# Patient Record
Sex: FEMALE | Race: WHITE | NOT HISPANIC OR LATINO | Employment: OTHER | URBAN - NONMETROPOLITAN AREA
[De-identification: names, ages, dates, MRNs, and addresses within clinical notes are randomized per-mention and may not be internally consistent; named-entity substitution may affect disease eponyms.]

---

## 2017-01-03 ENCOUNTER — OFFICE VISIT (OUTPATIENT)
Dept: CARDIOLOGY | Facility: CLINIC | Age: 67
End: 2017-01-03
Payer: MEDICARE

## 2017-01-03 VITALS
HEART RATE: 65 BPM | HEIGHT: 66 IN | DIASTOLIC BLOOD PRESSURE: 80 MMHG | BODY MASS INDEX: 35.36 KG/M2 | WEIGHT: 220 LBS | SYSTOLIC BLOOD PRESSURE: 150 MMHG | OXYGEN SATURATION: 91 %

## 2017-01-03 DIAGNOSIS — R55 SYNCOPE AND COLLAPSE: ICD-10-CM

## 2017-01-03 DIAGNOSIS — R06.09 DOE (DYSPNEA ON EXERTION): ICD-10-CM

## 2017-01-03 DIAGNOSIS — I10 HYPERTENSION, ESSENTIAL: ICD-10-CM

## 2017-01-03 DIAGNOSIS — E78.5 DYSLIPIDEMIA: ICD-10-CM

## 2017-01-03 DIAGNOSIS — Z98.890 H/O CAROTID ENDARTERECTOMY: ICD-10-CM

## 2017-01-03 LAB — EKG IMPRESSION: NORMAL

## 2017-01-03 PROCEDURE — 1036F TOBACCO NON-USER: CPT | Performed by: INTERNAL MEDICINE

## 2017-01-03 PROCEDURE — G8427 DOCREV CUR MEDS BY ELIG CLIN: HCPCS | Performed by: INTERNAL MEDICINE

## 2017-01-03 PROCEDURE — 3017F COLORECTAL CA SCREEN DOC REV: CPT | Mod: 1P | Performed by: INTERNAL MEDICINE

## 2017-01-03 PROCEDURE — 99204 OFFICE O/P NEW MOD 45 MIN: CPT | Performed by: INTERNAL MEDICINE

## 2017-01-03 PROCEDURE — G8419 CALC BMI OUT NRM PARAM NOF/U: HCPCS | Performed by: INTERNAL MEDICINE

## 2017-01-03 PROCEDURE — 93000 ELECTROCARDIOGRAM COMPLETE: CPT | Performed by: INTERNAL MEDICINE

## 2017-01-03 RX ORDER — METOPROLOL TARTRATE 100 MG/1
100 TABLET ORAL
COMMUNITY
End: 2017-06-26

## 2017-01-03 ASSESSMENT — ENCOUNTER SYMPTOMS
PALPITATIONS: 0
PND: 0
HEADACHES: 1
BLOOD IN STOOL: 1
TINGLING: 1
SHORTNESS OF BREATH: 1
DEPRESSION: 1
NERVOUS/ANXIOUS: 1
COUGH: 0
MYALGIAS: 1
ABDOMINAL PAIN: 1
FEVER: 1
BACK PAIN: 1
DOUBLE VISION: 1
DIZZINESS: 1
ORTHOPNEA: 0
LOSS OF CONSCIOUSNESS: 0

## 2017-01-03 NOTE — PROGRESS NOTES
Subjective:   Sylvia Diallo is a 66 y.o. female with known history of hypertension, dyslipidemia, carotid artery disease status post left carotid endarterectomy, GI ulcer with history of intermittent abdominal pain as well as melena presenting today for episodes of near syncope.    In October patient had 2 episodes of near syncope both of these episodes were precipitated when she bent her neck looking down. Did complain of spinning sensation associated with ringing in ears. Never passed out during these episodes. Intermittent episodes of fever when EBV titers are high. No complaints of exertional chest pain pressure or tightness. Does complain of dyspnea on exertion. Denied any complaints of orthopnea or PND. Intermittent episodes of lower extremity edema more prominent in [place].    Past Medical History   Diagnosis Date   • Chicken pox    • Heart disease    • Measles    • Mumps    • Rheumatic fever    • Venereal disease      test +   • EBV exposure    • [place] fever      unspecified    • Hypertension    • Rapid or irregular heartbeat    • Swelling of extremity of unknown etiology    • Sinus congestion    • Ear ache    • Enlarged glands    • Neck stiffness    • Sore throat    • Change in weight    • Blurry vision    • Eye disease    • Headache(784.0)    • Light sensitivity    • Vision loss    • Abdominal pain    • Abdominal bloating    • Dark stools    • Constipation    • Abdominal cramping    • Diarrhea    • Gallbladder disease    • Heart burn    • Hemorrhoids    • Nausea    • Peptic ulcer    • Fever    • Chills    • Fatigue    • History of night sweats    • Blood in urine    • Burning with urination    • Frequent urination    • Low libido    • Incontinent of urine    • Arthritis    • Back pain    • Difficulty walking    • Joint pain    • Muscle pain    • Varicose veins    • Dizziness    • Difficulty concentrating    • Coordination problem    • Falling    • Memory loss    • Numbness and tingling    • Visual  disturbances    • Anxiety    • Depression    • Eating disorder    • Pleurisy    • Shortness of breath    • Snoring    • URI (upper respiratory infection)    • Itching      Past Surgical History   Procedure Laterality Date   • Abdominal hysterectomy total     • Cervical fusion posterior N/A 1965   • Abdominal exploration       Gall bladder removal   • Pelvic reconstruction laparoscopic N/A 2006     Family History   Problem Relation Age of Onset   • Cancer Mother    • Arthritis Mother    • Heart Disease Mother    • Lung Disease Father    • Arthritis Father    • Heart Disease Father      History   Smoking status   • Former Smoker   • Quit date: 06/23/1972   Smokeless tobacco   • Not on file     Allergies   Allergen Reactions   • Darvon [Propoxyphene]    • Food Unspecified     fish   • Iodine Anaphylaxis     allergeic to shellfish   • Penicillins    • Seasonal    • Shellfish Allergy Anaphylaxis   • Codeine Itching     Outpatient Encounter Prescriptions as of 1/3/2017   Medication Sig Dispense Refill   • metoprolol (LOPRESSOR) 100 MG Tab Take 100 mg by mouth every bedtime.     • tramadol (ULTRAM) 50 MG Tab Take 1 Tab by mouth every 6 hours as needed. 120 Tab 0   • gabapentin (NEURONTIN) 800 MG tablet TAKE 1 TABLET THREE TIMES DAILY 270 Tab 3   • metoprolol (LOPRESSOR) 100 MG Tab TAKE 1 TABLET AT BEDTIME 90 Tab 3   • pantoprazole (PROTONIX) 40 MG Tablet Delayed Response TAKE 1 TABLET EVERY DAY 90 Tab 3   • estradiol (ESTRACE) 1 MG Tab TAKE 1 TABLET EVERY DAY 90 Tab 3   • atorvastatin (LIPITOR) 20 MG Tab TAKE 1 TABLET EVERY DAY IN THE EVENING 90 Tab 3   • citalopram (CELEXA) 10 MG tablet TAKE 1 TABLET EVERY DAY 90 Tab 3   • ramipril (ALTACE) 10 MG capsule Take 1 cap daily in the morning 90 Cap 3   • metoprolol (LOPRESSOR) 100 MG Tab TAKE ONE TABLET BY MOUTH AT BEDTIME 90 Tab 3   • lidocaine (LIDODERM) 5 % Patch Apply 1 Patch to skin as directed every 24 hours. 10 Patch 3   • clindamycin (CLEOCIN) 150 MG Cap 2  Caps 1 hour  "prior to dental work 2 Cap 0   • metoprolol (LOPRESSOR) 100 MG Tab TAKE 1 TABLET AT BEDTIME 90 Tab 3   • triamcinolone acetonide (KENALOG) 0.1 % Cream Apply bid prn to affected area 60 g 3   • lovastatin (MEVACOR) 20 MG Tab 1 at hs 90 Tab 3   • rizatriptan (MAXALT) 10 MG tablet Take 1 Tab by mouth Once PRN for Migraine for up to 1 dose. 10 Tab 3     No facility-administered encounter medications on file as of 1/3/2017.     Review of Systems   Constitutional: Positive for fever (when EBV titiers goes up).   HENT: Negative for tinnitus.    Eyes: Positive for double vision.        Decreased vision in right eye   Respiratory: Positive for shortness of breath. Negative for cough.    Cardiovascular: Negative for chest pain, palpitations, orthopnea, leg swelling and PND.   Gastrointestinal: Positive for abdominal pain and blood in stool.   Genitourinary: Negative for hematuria.   Musculoskeletal: Positive for myalgias, back pain and joint pain.   Skin: Negative for itching and rash.   Neurological: Positive for dizziness, tingling and headaches. Negative for loss of consciousness.   Psychiatric/Behavioral: Positive for depression. Negative for suicidal ideas. The patient is nervous/anxious.         Objective:   /80 mmHg  Pulse 65  Ht 1.664 m (5' 5.5\")  Wt 99.791 kg (220 lb)  BMI 36.04 kg/m2  SpO2 91%  LMP 01/01/1976    Physical Exam   Constitutional: She is oriented to person, place, and time. She appears well-developed and well-nourished.   HENT:   Head: Normocephalic and atraumatic.   Eyes: Conjunctivae are normal. Pupils are equal, round, and reactive to light. Right eye exhibits no discharge. Left eye exhibits no discharge.   Neck: No JVD present. No tracheal deviation present.   Cardiovascular: Normal rate and regular rhythm.    No murmur heard.  Pulses:       Carotid pulses are 2+ on the right side, and 2+ on the left side.       Radial pulses are 2+ on the right side, and 2+ on the left side.        " Dorsalis pedis pulses are 2+ on the right side, and 2+ on the left side.        Posterior tibial pulses are 2+ on the right side, and 2+ on the left side.   Pulmonary/Chest: Effort normal and breath sounds normal. No respiratory distress. She has no wheezes. She has no rales.   Abdominal: Soft. Bowel sounds are normal. She exhibits no distension. There is no tenderness. There is no rebound.   Musculoskeletal: Normal range of motion. She exhibits no edema.   Neurological: She is alert and oriented to person, place, and time. No cranial nerve deficit.   Skin: Skin is warm and dry. No erythema.   Psychiatric: She has a normal mood and affect. Her behavior is normal.     EK/3/17  Sinus bradycardia 55 bpm, normal axis T-wave inversion anterior leads  Assessment:     1. Syncope and collapse  EKG   2. H/O carotid endarterectomy  US-CAROTID DOPPLER   3. MONGE (dyspnea on exertion)  Echo-Rest/Stress w/o Contrast    Echocardiogram Comp w/o Cont   4. Dyslipidemia  COMP METABOLIC PANEL    LIPID PROFILE       Medical Decision Making:  Today's Assessment / Status / Plan:     1. Most probably benign positional vertigo  If patient has any further episodes of vertigo advised her to try over-the-counter Antivert.  2. Carotid artery Doppler prior to next visit.  3. Given T-wave inversion in anterior leads and complaints of dyspnea on exertion echo and stress echo prior to next visit.  Advised patient to take metoprolol 50 mg BID, other than taking 100 mg daily at bedtime.  4. Continue Lipitor 20 mg qHs.  Labs prior to next visit.  5. Patient's blood pressure is elevated as she did not take her a.m. antihypertensive agents.  Advised her to monitor blood pressure closely at home.  Continue ramipril 10 mg daily.    Follow-up in 6-8 weeks.  Echo, stress echo, carotid Doppler and labs prior to next visit.    Thank you for allowing me to participate in taking care of patient.    Maura Del Cid MD.

## 2017-01-03 NOTE — MR AVS SNAPSHOT
"        Sylvia Diallo   1/3/2017 9:00 AM   Office Visit   MRN: [id number]    Department:  [facility]   Dept Phone:  [phone]    Description:  Female : 1950   Provider:  Maura Gates M.D.           Reason for Visit     New Patient           Allergies as of 1/3/2017     Allergen Noted Reactions    Darvon [Propoxyphene] 2015       Food 2015   Unspecified    fish    Iodine 2017   Anaphylaxis    allergeic to shellfish    Penicillins 2015       Seasonal 2015       Shellfish Allergy 2017   Anaphylaxis    Codeine 2016   Itching      You were diagnosed with     Syncope and collapse   [780.2.ICD-9-CM]       H/O carotid endarterectomy   [570101]       MONGE (dyspnea on exertion)   [342539]       Dyslipidemia   [143120]         Vital Signs     Blood Pressure Pulse Height Weight Body Mass Index Oxygen Saturation    150/80 mmHg 65 1.664 m (5' 5.5\") 99.791 kg (220 lb) 36.04 kg/m2 91%    Last Menstrual Period Smoking Status                1976 Former Smoker          Basic Information     Date Of Birth Sex Race Ethnicity Preferred Language    1950 Female White Non- English      Your appointments     2017 10:30 AM   30 Minute with Arun Cheema M.D.   [facility] (--)    [address] [place] [address], [address]  [facility] [id number] [phone]            2017  9:20 AM   FOLLOW UP with Maura Gates M.D.   [facility]    [facility] [address]  2nd [address]  [facility] [id number] [phone]              Problem List              ICD-10-CM Priority Class Noted - Resolved    Migraine without aura and without status migrainosus, not intractable G43.009   2015 - Present    Bilateral carotid artery disease (HCC) I77.9   2015 - Present    Hyperlipidemia E78.5   2015 - Present    Hypertension, essential I10   2015 - Present    Osteoarthritis cervical " spine M47.812   8/21/2015 - Present    Chronic neck pain M54.2, G89.29   8/21/2015 - Present    Family hx of colon cancer Z80.0   9/29/2015 - Present    History of peptic ulcer disease Z87.11   9/29/2015 - Present    History of colon polyps Z86.010   11/4/2015 - Present    Diverticulosis of large intestine without hemorrhage K57.30   11/13/2015 - Present    Internal hemorrhoids K64.8   11/13/2015 - Present    H/O carotid endarterectomy Z98.890   1/3/2017 - Present      Health Maintenance        Date Due Completion Dates    IMM DTaP/Tdap/Td Vaccine (1 - Tdap) 2/26/1967 ---    PAP SMEAR 2/26/1969 ---    MAMMOGRAM 2/26/1988 ---    COLONOSCOPY 2/26/1998 ---    IMM ZOSTER VACCINE 2/26/2008 ---    BONE DENSITY 2/26/2013 ---    IMM INFLUENZA (1) 9/1/2016 10/29/2015    IMM PNEUMOCOCCAL 65+ (ADULT) LOW/MEDIUM RISK SERIES (2 of 2 - PPSV23) 3/1/2017 3/1/2016            Results       Current Immunizations     13-VALENT PCV PREVNAR 3/1/2016  9:46 AM    Influenza Vaccine Quad Inj (Preserved) 10/29/2015      Below and/or attached are the medications your provider expects you to take. Review all of your home medications and newly ordered medications with your provider and/or pharmacist. Follow medication instructions as directed by your provider and/or pharmacist. Please keep your medication list with you and share with your provider. Update the information when medications are discontinued, doses are changed, or new medications (including over-the-counter products) are added; and carry medication information at all times in the event of emergency situations     Allergies:  DARVON - (reactions not documented)     FOOD - Unspecified     IODINE - Anaphylaxis     PENICILLINS - (reactions not documented)     SEASONAL - (reactions not documented)     SHELLFISH ALLERGY - Anaphylaxis     CODEINE - Itching               Medications  Valid as of: January 03, 2017 -  9:35 AM    Generic Name Brand Name Tablet Size Instructions for use     Atorvastatin Calcium (Tab) LIPITOR 20 MG TAKE 1 TABLET EVERY DAY IN THE EVENING        Citalopram Hydrobromide (Tab) CELEXA 10 MG TAKE 1 TABLET EVERY DAY        Clindamycin HCl (Cap) CLEOCIN 150 MG 2  Caps 1 hour prior to dental work        Estradiol (Tab) ESTRACE 1 MG TAKE 1 TABLET EVERY DAY        Gabapentin (Tab) NEURONTIN 800 MG TAKE 1 TABLET THREE TIMES DAILY        Lidocaine (Patch) LIDODERM 5 % Apply 1 Patch to skin as directed every 24 hours.        Lovastatin (Tab) MEVACOR 20 MG 1 at hs        Metoprolol Tartrate (Tab) LOPRESSOR 100 MG TAKE 1 TABLET AT BEDTIME        Metoprolol Tartrate (Tab) LOPRESSOR 100 MG TAKE 1 TABLET AT BEDTIME        Metoprolol Tartrate (Tab) LOPRESSOR 100 MG TAKE ONE TABLET BY MOUTH AT BEDTIME        Metoprolol Tartrate (Tab) LOPRESSOR 100 MG Take 100 mg by mouth every bedtime.        Pantoprazole Sodium (Tablet Delayed Response) PROTONIX 40 MG TAKE 1 TABLET EVERY DAY        Ramipril (Cap) ALTACE 10 MG Take 1 cap daily in the morning        Rizatriptan Benzoate (Tab) MAXALT 10 MG Take 1 Tab by mouth Once PRN for Migraine for up to 1 dose.        TraMADol HCl (Tab) ULTRAM 50 MG Take 1 Tab by mouth every 6 hours as needed.        Triamcinolone Acetonide (Cream) KENALOG 0.1 % Apply bid prn to affected area        .                 Medicines prescribed today were sent to:     [facility] - [place], [place] - [address] [facility] [address] [facility]    [address] [facility] [address]    Phone: [phone] Fax: [phone]    Open 24 Hours?: No    [facility] [address] [place], [place] - [address] [address] [facility] [address]    Phone: [phone] Fax: [phone]    Open 24 Hours?: No      Medication refill instructions:       If your prescription bottle indicates you have medication refills left, it is not necessary to call your provider’s office. Please contact your pharmacy and they will refill your medication.    If your prescription bottle indicates you do  not have any refills left, you may request refills at any time through one of the following ways: The online [employer] system (except Urgent Care), by calling your provider’s office, or by asking your pharmacy to contact your provider’s office with a refill request. Medication refills are processed only during regular business hours and may not be available until the next business day. Your provider may request additional information or to have a follow-up visit with you prior to refilling your medication.   *Please Note: Medication refills are assigned a new Rx number when refilled electronically. Your pharmacy may indicate that no refills were authorized even though a new prescription for the same medication is available at the pharmacy. Please request the medicine by name with the pharmacy before contacting your provider for a refill.        Your To Do List     Future Labs/Procedures Complete By Expires    COMP METABOLIC PANEL  As directed 1/3/2018    Echo-Rest/Stress w/o Contrast  As directed 1/3/2018    Echocardiogram Comp w/o Cont  As directed 1/3/2018    LIPID PROFILE  As directed 1/3/2018    US-CAROTID DOPPLER  As directed 1/3/2018         [employer] Access Code: Activation code not generated  Current [employer] Status: Active

## 2017-01-03 NOTE — Clinical Note
[facility] [place] [address],   [address]  [place] [address]  Phone: [phone]  Fax: [phone]              Sylvia Diallo  1950    Encounter Date: 1/3/2017    Maura Gates M.D.          PROGRESS NOTE:  Subjective:   Sylvia Diallo is a 66 y.o. female with known history of hypertension, dyslipidemia, carotid artery disease status post left carotid endarterectomy, GI ulcer with history of intermittent abdominal pain as well as melena presenting today for episodes of near syncope.    In October patient had 2 episodes of near syncope both of these episodes were precipitated when she bent her neck looking down. Did complain of spinning sensation associated with ringing in ears. Never passed out during these episodes. Intermittent episodes of fever when EBV titers are high. No complaints of exertional chest pain pressure or tightness. Does complain of dyspnea on exertion. Denied any complaints of orthopnea or PND. Intermittent episodes of lower extremity edema more prominent in [place].    Past Medical History   Diagnosis Date   • Chicken pox    • Heart disease    • Measles    • Mumps    • Rheumatic fever    • Venereal disease      test +   • EBV exposure    • [place] fever      unspecified    • Hypertension    • Rapid or irregular heartbeat    • Swelling of extremity of unknown etiology    • Sinus congestion    • Ear ache    • Enlarged glands    • Neck stiffness    • Sore throat    • Change in weight    • Blurry vision    • Eye disease    • Headache(784.0)    • Light sensitivity    • Vision loss    • Abdominal pain    • Abdominal bloating    • Dark stools    • Constipation    • Abdominal cramping    • Diarrhea    • Gallbladder disease    • Heart burn    • Hemorrhoids    • Nausea    • Peptic ulcer    • Fever    • Chills    • Fatigue    • History of night sweats    • Blood in urine    • Burning with urination    • Frequent urination    •  Low libido    • Incontinent of urine    • Arthritis    • Back pain    • Difficulty walking    • Joint pain    • Muscle pain    • Varicose veins    • Dizziness    • Difficulty concentrating    • Coordination problem    • Falling    • Memory loss    • Numbness and tingling    • Visual disturbances    • Anxiety    • Depression    • Eating disorder    • Pleurisy    • Shortness of breath    • Snoring    • URI (upper respiratory infection)    • Itching      Past Surgical History   Procedure Laterality Date   • Abdominal hysterectomy total     • Cervical fusion posterior N/A 1965   • Abdominal exploration       Gall bladder removal   • Pelvic reconstruction laparoscopic N/A 2006     Family History   Problem Relation Age of Onset   • Cancer Mother    • Arthritis Mother    • Heart Disease Mother    • Lung Disease Father    • Arthritis Father    • Heart Disease Father      History   Smoking status   • Former Smoker   • Quit date: 06/23/1972   Smokeless tobacco   • Not on file     Allergies   Allergen Reactions   • Darvon [Propoxyphene]    • Food Unspecified     fish   • Iodine Anaphylaxis     allergeic to shellfish   • Penicillins    • Seasonal    • Shellfish Allergy Anaphylaxis   • Codeine Itching     Outpatient Encounter Prescriptions as of 1/3/2017   Medication Sig Dispense Refill   • metoprolol (LOPRESSOR) 100 MG Tab Take 100 mg by mouth every bedtime.     • tramadol (ULTRAM) 50 MG Tab Take 1 Tab by mouth every 6 hours as needed. 120 Tab 0   • gabapentin (NEURONTIN) 800 MG tablet TAKE 1 TABLET THREE TIMES DAILY 270 Tab 3   • metoprolol (LOPRESSOR) 100 MG Tab TAKE 1 TABLET AT BEDTIME 90 Tab 3   • pantoprazole (PROTONIX) 40 MG Tablet Delayed Response TAKE 1 TABLET EVERY DAY 90 Tab 3   • estradiol (ESTRACE) 1 MG Tab TAKE 1 TABLET EVERY DAY 90 Tab 3   • atorvastatin (LIPITOR) 20 MG Tab TAKE 1 TABLET EVERY DAY IN THE EVENING 90 Tab 3   • citalopram (CELEXA) 10 MG tablet TAKE 1 TABLET EVERY DAY 90 Tab 3   • ramipril (ALTACE) 10  "MG capsule Take 1 cap daily in the morning 90 Cap 3   • metoprolol (LOPRESSOR) 100 MG Tab TAKE ONE TABLET BY MOUTH AT BEDTIME 90 Tab 3   • lidocaine (LIDODERM) 5 % Patch Apply 1 Patch to skin as directed every 24 hours. 10 Patch 3   • clindamycin (CLEOCIN) 150 MG Cap 2  Caps 1 hour prior to dental work 2 Cap 0   • metoprolol (LOPRESSOR) 100 MG Tab TAKE 1 TABLET AT BEDTIME 90 Tab 3   • triamcinolone acetonide (KENALOG) 0.1 % Cream Apply bid prn to affected area 60 g 3   • lovastatin (MEVACOR) 20 MG Tab 1 at hs 90 Tab 3   • rizatriptan (MAXALT) 10 MG tablet Take 1 Tab by mouth Once PRN for Migraine for up to 1 dose. 10 Tab 3     No facility-administered encounter medications on file as of 1/3/2017.     Review of Systems   Constitutional: Positive for fever (when EBV titiers goes up).   HENT: Negative for tinnitus.    Eyes: Positive for double vision.        Decreased vision in right eye   Respiratory: Positive for shortness of breath. Negative for cough.    Cardiovascular: Negative for chest pain, palpitations, orthopnea, leg swelling and PND.   Gastrointestinal: Positive for abdominal pain and blood in stool.   Genitourinary: Negative for hematuria.   Musculoskeletal: Positive for myalgias, back pain and joint pain.   Skin: Negative for itching and rash.   Neurological: Positive for dizziness, tingling and headaches. Negative for loss of consciousness.   Psychiatric/Behavioral: Positive for depression. Negative for suicidal ideas. The patient is nervous/anxious.         Objective:   /80 mmHg  Pulse 65  Ht 1.664 m (5' 5.5\")  Wt 99.791 kg (220 lb)  BMI 36.04 kg/m2  SpO2 91%  LMP 01/01/1976    Physical Exam   Constitutional: She is oriented to person, place, and time. She appears well-developed and well-nourished.   HENT:   Head: Normocephalic and atraumatic.   Eyes: Conjunctivae are normal. Pupils are equal, round, and reactive to light. Right eye exhibits no discharge. Left eye exhibits no discharge.   "   Neck: No JVD present. No tracheal deviation present.   Cardiovascular: Normal rate and regular rhythm.    No murmur heard.  Pulses:       Carotid pulses are 2+ on the right side, and 2+ on the left side.       Radial pulses are 2+ on the right side, and 2+ on the left side.        Dorsalis pedis pulses are 2+ on the right side, and 2+ on the left side.        Posterior tibial pulses are 2+ on the right side, and 2+ on the left side.   Pulmonary/Chest: Effort normal and breath sounds normal. No respiratory distress. She has no wheezes. She has no rales.   Abdominal: Soft. Bowel sounds are normal. She exhibits no distension. There is no tenderness. There is no rebound.   Musculoskeletal: Normal range of motion. She exhibits no edema.   Neurological: She is alert and oriented to person, place, and time. No cranial nerve deficit.   Skin: Skin is warm and dry. No erythema.   Psychiatric: She has a normal mood and affect. Her behavior is normal.     EK/3/17  Sinus bradycardia 55 bpm, normal axis T-wave inversion anterior leads  Assessment:     1. Syncope and collapse  EKG   2. H/O carotid endarterectomy  US-CAROTID DOPPLER   3. MONGE (dyspnea on exertion)  Echo-Rest/Stress w/o Contrast    Echocardiogram Comp w/o Cont   4. Dyslipidemia  COMP METABOLIC PANEL    LIPID PROFILE       Medical Decision Making:  Today's Assessment / Status / Plan:     1. Most probably benign positional vertigo  If patient has any further episodes of vertigo advised her to try over-the-counter Antivert.  2. Carotid artery Doppler prior to next visit.  3. Given T-wave inversion in anterior leads and complaints of dyspnea on exertion echo and stress echo prior to next visit.  Advised patient to take metoprolol 50 mg BID, other than taking 100 mg daily at bedtime.  4. Continue Lipitor 20 mg qHs.  Labs prior to next visit.  5. Patient's blood pressure is elevated as she did not take her a.m. antihypertensive agents.  Advised her to monitor blood  pressure closely at home.  Continue ramipril 10 mg daily.    Follow-up in 6-8 weeks.  Echo, stress echo, carotid Doppler and labs prior to next visit.    Thank you for allowing me to participate in taking care of patient.    Maura Del Cid MD.      Arun Cheema M.D.  [address] [facility] [id number]  VIA In Basket

## 2017-01-06 ENCOUNTER — TELEPHONE (OUTPATIENT)
Dept: CARDIOLOGY | Facility: MEDICAL CENTER | Age: 67
End: 2017-01-06

## 2017-01-06 NOTE — TELEPHONE ENCOUNTER
----- Message from Yanely Schroeder L.P.N. sent at 1/6/2017  2:14 PM PST -----      ----- Message -----     From: Maura Gates M.D.     Sent: 1/6/2017  12:40 PM       To: Yisel Berkowitz R.N.    No evidence of hemodynamically significant stenosis (zero to 40%).  ----- Message -----     From: Yisel Berkowitz R.N.     Sent: 1/6/2017  12:02 PM       To: Maura Gates M.D.

## 2017-01-06 NOTE — PROGRESS NOTES
Stress echo: 1/30/17  Patient exercised on treadmill for 4 minutes reached 103% MPHR achieved 7 METs.  Negative stress echocardiogram for ischemia with adequate stress achieved by heart rate criteria    Carotid doppler: 1/5/17  No evidence of hemodynamically significant stenosis (zero to 40%).

## 2017-01-30 ENCOUNTER — NON-PROVIDER VISIT (OUTPATIENT)
Dept: CARDIOLOGY | Facility: PHYSICIAN GROUP | Age: 67
End: 2017-01-30
Payer: MEDICARE

## 2017-01-30 DIAGNOSIS — R06.09 DOE (DYSPNEA ON EXERTION): ICD-10-CM

## 2017-01-30 PROCEDURE — 93350 STRESS TTE ONLY: CPT | Performed by: INTERNAL MEDICINE

## 2017-01-30 PROCEDURE — 93018 CV STRESS TEST I&R ONLY: CPT | Performed by: INTERNAL MEDICINE

## 2017-02-01 LAB — LV EJECT FRACT  99904: 55

## 2017-02-06 ENCOUNTER — TELEPHONE (OUTPATIENT)
Dept: CARDIOLOGY | Facility: MEDICAL CENTER | Age: 67
End: 2017-02-06

## 2017-02-06 NOTE — TELEPHONE ENCOUNTER
----- Message from Yanely Schroeder L.P.N. sent at 2/6/2017  8:28 AM PST -----      ----- Message -----     From: Maura Gates M.D.     Sent: 2/2/2017   2:49 AM       To: Yisel Berkowitz R.N.    Negative stress echocardiogram for ischemia with adequate stress achieved by heart rate criteria  ----- Message -----     From: Yisel Berkowitz R.N.     Sent: 2/1/2017   3:29 PM       To: Maura Gates M.D.

## 2017-02-14 ENCOUNTER — TELEPHONE (OUTPATIENT)
Dept: CARDIOLOGY | Facility: MEDICAL CENTER | Age: 67
End: 2017-02-14

## 2017-02-14 ENCOUNTER — OFFICE VISIT (OUTPATIENT)
Dept: CARDIOLOGY | Facility: CLINIC | Age: 67
End: 2017-02-14
Payer: MEDICARE

## 2017-02-14 VITALS
SYSTOLIC BLOOD PRESSURE: 120 MMHG | DIASTOLIC BLOOD PRESSURE: 70 MMHG | OXYGEN SATURATION: 91 % | HEART RATE: 71 BPM | BODY MASS INDEX: 35.84 KG/M2 | WEIGHT: 223 LBS | HEIGHT: 66 IN

## 2017-02-14 DIAGNOSIS — G47.30 SLEEP APNEA, UNSPECIFIED TYPE: ICD-10-CM

## 2017-02-14 DIAGNOSIS — I10 HYPERTENSION, ESSENTIAL: ICD-10-CM

## 2017-02-14 DIAGNOSIS — Z98.890 H/O CAROTID ENDARTERECTOMY: ICD-10-CM

## 2017-02-14 DIAGNOSIS — E78.5 DYSLIPIDEMIA: ICD-10-CM

## 2017-02-14 PROCEDURE — 3017F COLORECTAL CA SCREEN DOC REV: CPT | Mod: 1P | Performed by: INTERNAL MEDICINE

## 2017-02-14 PROCEDURE — G8484 FLU IMMUNIZE NO ADMIN: HCPCS | Performed by: INTERNAL MEDICINE

## 2017-02-14 PROCEDURE — 99214 OFFICE O/P EST MOD 30 MIN: CPT | Performed by: INTERNAL MEDICINE

## 2017-02-14 PROCEDURE — 1101F PT FALLS ASSESS-DOCD LE1/YR: CPT | Performed by: INTERNAL MEDICINE

## 2017-02-14 PROCEDURE — G8432 DEP SCR NOT DOC, RNG: HCPCS | Performed by: INTERNAL MEDICINE

## 2017-02-14 PROCEDURE — 3014F SCREEN MAMMO DOC REV: CPT | Performed by: INTERNAL MEDICINE

## 2017-02-14 PROCEDURE — G8419 CALC BMI OUT NRM PARAM NOF/U: HCPCS | Performed by: INTERNAL MEDICINE

## 2017-02-14 PROCEDURE — 4040F PNEUMOC VAC/ADMIN/RCVD: CPT | Performed by: INTERNAL MEDICINE

## 2017-02-14 PROCEDURE — 1036F TOBACCO NON-USER: CPT | Performed by: INTERNAL MEDICINE

## 2017-02-14 RX ORDER — METOPROLOL TARTRATE 50 MG/1
50 TABLET, FILM COATED ORAL 2 TIMES DAILY
COMMUNITY
End: 2018-02-15

## 2017-02-14 ASSESSMENT — ENCOUNTER SYMPTOMS
PALPITATIONS: 0
BACK PAIN: 1
MYALGIAS: 1
NERVOUS/ANXIOUS: 1
SHORTNESS OF BREATH: 1
HEADACHES: 1
DEPRESSION: 1
ORTHOPNEA: 0
COUGH: 0
TINGLING: 1
PND: 0
LOSS OF CONSCIOUSNESS: 0
FEVER: 1
ABDOMINAL PAIN: 0
DOUBLE VISION: 1
DIZZINESS: 0
BLOOD IN STOOL: 0

## 2017-02-14 NOTE — PROGRESS NOTES
Subjective:   Sylvia Diallo is a 66 y.o. female with known history of hypertension, dyslipidemia, carotid artery disease status post left carotid endarterectomy, history of GI ulcer presenting today for follow-up evaluation of dyspnea on exertion.    Since last visit patient denied any further episodes of syncope. Still continues to have dyspnea on exertion which is unchanged. Complaining of generalized fatigue and tiredness. Some days patient gets up at least 3-4 times in the middle of the night to take care of her  leading to disturb sleep that may be one of the reasons why patient is feeling tired. No complaints of exertional chest pain, pressure or tightness. Intermittent episodes of abdominal pain. No complaints of lower extremity edema or claudication.    Past Medical History   Diagnosis Date   • Chicken pox    • Heart disease    • Measles    • Mumps    • Rheumatic fever    • Venereal disease      test +   • EBV exposure    • [place] fever      unspecified    • Hypertension    • Rapid or irregular heartbeat    • Swelling of extremity of unknown etiology    • Sinus congestion    • Ear ache    • Enlarged glands    • Neck stiffness    • Sore throat    • Change in weight    • Blurry vision    • Eye disease    • Headache(784.0)    • Light sensitivity    • Vision loss    • Abdominal pain    • Abdominal bloating    • Dark stools    • Constipation    • Abdominal cramping    • Diarrhea    • Gallbladder disease    • Heart burn    • Hemorrhoids    • Nausea    • Peptic ulcer    • Fever    • Chills    • Fatigue    • History of night sweats    • Blood in urine    • Burning with urination    • Frequent urination    • Low libido    • Incontinent of urine    • Arthritis    • Back pain    • Difficulty walking    • Joint pain    • Muscle pain    • Varicose veins    • Dizziness    • Difficulty concentrating    • Coordination problem    • Falling    • Memory loss    • Numbness and tingling    • Visual disturbances    •  Anxiety    • Depression    • Eating disorder    • Pleurisy    • Shortness of breath    • Snoring    • URI (upper respiratory infection)    • Itching      Past Surgical History   Procedure Laterality Date   • Abdominal hysterectomy total     • Cervical fusion posterior N/A 1965   • Abdominal exploration       Gall bladder removal   • Pelvic reconstruction laparoscopic N/A 2006     Family History   Problem Relation Age of Onset   • Cancer Mother    • Arthritis Mother    • Heart Disease Mother    • Lung Disease Father    • Arthritis Father    • Heart Disease Father      History   Smoking status   • Former Smoker   • Quit date: 06/23/1972   Smokeless tobacco   • Never Used     Allergies   Allergen Reactions   • Darvon [Propoxyphene]    • Food Unspecified     fish   • Iodine Anaphylaxis     allergeic to shellfish   • Penicillins    • Seasonal    • Shellfish Allergy Anaphylaxis   • Codeine Itching     Outpatient Encounter Prescriptions as of 2/14/2017   Medication Sig Dispense Refill   • gabapentin (NEURONTIN) 800 MG tablet TAKE 1 TABLET THREE TIMES DAILY 270 Tab 3   • citalopram (CELEXA) 10 MG tablet TAKE 1 TABLET EVERY DAY 90 Tab 3   • ofloxacin (OCUFLOX) 0.3 % Solution Place 1 Drop in right eye 4 times a day. 1 Bottle 0   • metoprolol (LOPRESSOR) 100 MG Tab TAKE ONE TABLET BY MOUTH AT BEDTIME 90 Tab 3   • pantoprazole (PROTONIX) 40 MG Tablet Delayed Response TAKE 1 TABLET EVERY DAY 90 Tab 3   • estradiol (ESTRACE) 1 MG Tab TAKE 1 TABLET EVERY DAY 90 Tab 3   • atorvastatin (LIPITOR) 20 MG Tab TAKE 1 TABLET EVERY DAY IN THE EVENING 90 Tab 3   • ramipril (ALTACE) 10 MG capsule Take 1 cap daily in the morning 90 Cap 3   • tramadol (ULTRAM) 50 MG Tab Take 1 Tab by mouth every 6 hours as needed. 120 Tab 0   • metoprolol (LOPRESSOR) 100 MG Tab Take 100 mg by mouth every bedtime.     • metoprolol (LOPRESSOR) 100 MG Tab TAKE 1 TABLET AT BEDTIME 90 Tab 3   • lidocaine (LIDODERM) 5 % Patch Apply 1 Patch to skin as directed every  "24 hours. 10 Patch 3   • clindamycin (CLEOCIN) 150 MG Cap 2  Caps 1 hour prior to dental work 2 Cap 0   • metoprolol (LOPRESSOR) 100 MG Tab TAKE 1 TABLET AT BEDTIME 90 Tab 3   • triamcinolone acetonide (KENALOG) 0.1 % Cream Apply bid prn to affected area 60 g 3   • lovastatin (MEVACOR) 20 MG Tab 1 at hs 90 Tab 3   • rizatriptan (MAXALT) 10 MG tablet Take 1 Tab by mouth Once PRN for Migraine for up to 1 dose. 10 Tab 3     No facility-administered encounter medications on file as of 2/14/2017.     Review of Systems   Constitutional: Positive for fever (when EBV titiers goes up) and malaise/fatigue.   HENT: Negative for tinnitus.    Eyes: Positive for double vision.        Decreased vision in right eye   Respiratory: Positive for shortness of breath. Negative for cough.    Cardiovascular: Negative for chest pain, palpitations, orthopnea, leg swelling and PND.   Gastrointestinal: Negative for abdominal pain and blood in stool.   Genitourinary: Negative for hematuria.   Musculoskeletal: Positive for myalgias, back pain and joint pain.   Skin: Negative for itching and rash.   Neurological: Positive for tingling and headaches. Negative for dizziness and loss of consciousness.   Psychiatric/Behavioral: Positive for depression. Negative for suicidal ideas. The patient is nervous/anxious.         Objective:   /70 mmHg  Pulse 71  Ht 1.664 m (5' 5.51\")  Wt 101.152 kg (223 lb)  BMI 36.53 kg/m2  SpO2 91%  LMP 01/01/1976    Physical Exam   Constitutional: She is oriented to person, place, and time. She appears well-developed and well-nourished.   HENT:   Head: Normocephalic and atraumatic.   Eyes: Conjunctivae are normal. Pupils are equal, round, and reactive to light. Right eye exhibits no discharge. Left eye exhibits no discharge.   Neck: No JVD present. No tracheal deviation present.   Cardiovascular: Normal rate and regular rhythm.    No murmur heard.  Pulses:       Carotid pulses are 2+ on the right side, and 2+ " on the left side.       Radial pulses are 2+ on the right side, and 2+ on the left side.        Dorsalis pedis pulses are 2+ on the right side, and 2+ on the left side.        Posterior tibial pulses are 2+ on the right side, and 2+ on the left side.   Pulmonary/Chest: Effort normal and breath sounds normal. No respiratory distress. She has no wheezes. She has no rales.   Abdominal: Soft. Bowel sounds are normal. She exhibits no distension. There is no tenderness. There is no rebound.   Musculoskeletal: Normal range of motion. She exhibits no edema.   Neurological: She is alert and oriented to person, place, and time. No cranial nerve deficit.   Skin: Skin is warm and dry. No erythema.   Psychiatric: She has a normal mood and affect. Her behavior is normal.   Results for GURPREET SHEIKH (MRN [id number]) as of 2017 09:28   1/10/2017    Sodium 133 (L)   Potassium 4.3   Chloride 103   Co2 26   Anion Gap 8 (L)   Glucose 94   Bun 16   Creatinine 0.9   GFR If Non African American >60   Calcium 9.0   AST(SGOT) 16   ALT(SGPT) 23   Alkaline Phosphatase 65   Cholesterol,Tot 208 (H)   Triglycerides 249 (H)   HDL 73.0 (H)   Non HDL Cholesterol 135   LDL 85   Chol-Hdl Ratio 2.85       Stress echo: 17  Patient exercised on treadmill for 4 minutes reached 103% MPHR achieved 7 METs.  Negative stress echocardiogram for ischemia with adequate stress achieved by heart rate criteria    Carotid doppler: 17  No evidence of hemodynamically significant stenosis (zero to 40%).    EK/3/17  Sinus bradycardia 55 bpm, normal axis T-wave inversion anterior leads  Assessment:     1. Dyspnea on exertion  2. HTN  3. Dyslipidemia  4. Sleep apnea  Medical Decision Making:  Today's Assessment / Status / Plan:     1. Stress echo negative for ischemia.  Most probably deconditioning related to dyspnea on exertion.  Encourage patient to slowly increase his physical activity as tolerated.   Right heart pressures on stress echo but  approximately 13 mmHg.  2. Repeat Doppler in 2 years.  3. Continue Lipitor 20 mg qHs.  Hypertriglyceridemia-recommended dietary modification.  4. Referral to pulmonary for titration of her CPAP settings.    Follow-up in 9 months.  Labs prior to next visit.    Thank you for allowing me to participate in taking care of patient.    Maura Del Cid MD.

## 2017-02-14 NOTE — TELEPHONE ENCOUNTER
REFERRAL TO PULMONOLOGY         Amita Santos       Sent: Tue February 14, 2017 12:51 PM       To: Brittney Snow R.N.              Message        Patient has been referred to [facility].        If patient is not contacted in a timely manner, Please call [phone]       Thank You

## 2017-02-14 NOTE — Clinical Note
[facility] [address],   [address]  [place] [address]  Phone: [phone]  Fax: [phone]              Sylvia Diallo  1950    Encounter Date: 2/14/2017    Maura Gates M.D.          PROGRESS NOTE:  Subjective:   Sylvia Diallo is a 66 y.o. female with known history of hypertension, dyslipidemia, carotid artery disease status post left carotid endarterectomy, GI ulcer with history of intermittent abdominal pain as well as melena presenting today for episodes of near syncope.    In October patient had 2 episodes of near syncope both of these episodes were precipitated when she bent her neck looking down. Did complain of spinning sensation associated with ringing in ears. Never passed out during these episodes. Intermittent episodes of fever when EBV titers are high. No complaints of exertional chest pain pressure or tightness. Does complain of dyspnea on exertion. Denied any complaints of orthopnea or PND. Intermittent episodes of lower extremity edema more prominent in [place].    Past Medical History   Diagnosis Date   • Chicken pox    • Heart disease    • Measles    • Mumps    • Rheumatic fever    • Venereal disease      test +   • EBV exposure    • [place] fever      unspecified    • Hypertension    • Rapid or irregular heartbeat    • Swelling of extremity of unknown etiology    • Sinus congestion    • Ear ache    • Enlarged glands    • Neck stiffness    • Sore throat    • Change in weight    • Blurry vision    • Eye disease    • Headache(784.0)    • Light sensitivity    • Vision loss    • Abdominal pain    • Abdominal bloating    • Dark stools    • Constipation    • Abdominal cramping    • Diarrhea    • Gallbladder disease    • Heart burn    • Hemorrhoids    • Nausea    • Peptic ulcer    • Fever    • Chills    • Fatigue    • History of night sweats    • Blood in urine    • Burning with urination    • Frequent urination       • Low libido    • Incontinent of urine    • Arthritis    • Back pain    • Difficulty walking    • Joint pain    • Muscle pain    • Varicose veins    • Dizziness    • Difficulty concentrating    • Coordination problem    • Falling    • Memory loss    • Numbness and tingling    • Visual disturbances    • Anxiety    • Depression    • Eating disorder    • Pleurisy    • Shortness of breath    • Snoring    • URI (upper respiratory infection)    • Itching      Past Surgical History   Procedure Laterality Date   • Abdominal hysterectomy total     • Cervical fusion posterior N/A 1965   • Abdominal exploration       Gall bladder removal   • Pelvic reconstruction laparoscopic N/A 2006     Family History   Problem Relation Age of Onset   • Cancer Mother    • Arthritis Mother    • Heart Disease Mother    • Lung Disease Father    • Arthritis Father    • Heart Disease Father      History   Smoking status   • Former Smoker   • Quit date: 06/23/1972   Smokeless tobacco   • Never Used     Allergies   Allergen Reactions   • Darvon [Propoxyphene]    • Food Unspecified     fish   • Iodine Anaphylaxis     allergeic to shellfish   • Penicillins    • Seasonal    • Shellfish Allergy Anaphylaxis   • Codeine Itching     Outpatient Encounter Prescriptions as of 2/14/2017   Medication Sig Dispense Refill   • gabapentin (NEURONTIN) 800 MG tablet TAKE 1 TABLET THREE TIMES DAILY 270 Tab 3   • citalopram (CELEXA) 10 MG tablet TAKE 1 TABLET EVERY DAY 90 Tab 3   • ofloxacin (OCUFLOX) 0.3 % Solution Place 1 Drop in right eye 4 times a day. 1 Bottle 0   • metoprolol (LOPRESSOR) 100 MG Tab TAKE ONE TABLET BY MOUTH AT BEDTIME 90 Tab 3   • pantoprazole (PROTONIX) 40 MG Tablet Delayed Response TAKE 1 TABLET EVERY DAY 90 Tab 3   • estradiol (ESTRACE) 1 MG Tab TAKE 1 TABLET EVERY DAY 90 Tab 3   • atorvastatin (LIPITOR) 20 MG Tab TAKE 1 TABLET EVERY DAY IN THE EVENING 90 Tab 3   • ramipril (ALTACE) 10 MG capsule Take 1 cap daily in the morning 90 Cap 3   •  "tramadol (ULTRAM) 50 MG Tab Take 1 Tab by mouth every 6 hours as needed. 120 Tab 0   • metoprolol (LOPRESSOR) 100 MG Tab Take 100 mg by mouth every bedtime.     • metoprolol (LOPRESSOR) 100 MG Tab TAKE 1 TABLET AT BEDTIME 90 Tab 3   • lidocaine (LIDODERM) 5 % Patch Apply 1 Patch to skin as directed every 24 hours. 10 Patch 3   • clindamycin (CLEOCIN) 150 MG Cap 2  Caps 1 hour prior to dental work 2 Cap 0   • metoprolol (LOPRESSOR) 100 MG Tab TAKE 1 TABLET AT BEDTIME 90 Tab 3   • triamcinolone acetonide (KENALOG) 0.1 % Cream Apply bid prn to affected area 60 g 3   • lovastatin (MEVACOR) 20 MG Tab 1 at hs 90 Tab 3   • rizatriptan (MAXALT) 10 MG tablet Take 1 Tab by mouth Once PRN for Migraine for up to 1 dose. 10 Tab 3     No facility-administered encounter medications on file as of 2/14/2017.     Review of Systems   Constitutional: Positive for fever (when EBV titiers goes up).   HENT: Negative for tinnitus.    Eyes: Positive for double vision.        Decreased vision in right eye   Respiratory: Positive for shortness of breath. Negative for cough.    Cardiovascular: Negative for chest pain, palpitations, orthopnea, leg swelling and PND.   Gastrointestinal: Positive for abdominal pain and blood in stool.   Genitourinary: Negative for hematuria.   Musculoskeletal: Positive for myalgias, back pain and joint pain.   Skin: Negative for itching and rash.   Neurological: Positive for dizziness, tingling and headaches. Negative for loss of consciousness.   Psychiatric/Behavioral: Positive for depression. Negative for suicidal ideas. The patient is nervous/anxious.         Objective:   /70 mmHg  Pulse 71  Ht 1.664 m (5' 5.51\")  Wt 101.152 kg (223 lb)  BMI 36.53 kg/m2  SpO2 91%  LMP 01/01/1976    Physical Exam   Constitutional: She is oriented to person, place, and time. She appears well-developed and well-nourished.   HENT:   Head: Normocephalic and atraumatic.   Eyes: Conjunctivae are normal. Pupils are equal, " round, and reactive to light. Right eye exhibits no discharge. Left eye exhibits no discharge.   Neck: No JVD present. No tracheal deviation present.   Cardiovascular: Normal rate and regular rhythm.    No murmur heard.  Pulses:       Carotid pulses are 2+ on the right side, and 2+ on the left side.       Radial pulses are 2+ on the right side, and 2+ on the left side.        Dorsalis pedis pulses are 2+ on the right side, and 2+ on the left side.        Posterior tibial pulses are 2+ on the right side, and 2+ on the left side.   Pulmonary/Chest: Effort normal and breath sounds normal. No respiratory distress. She has no wheezes. She has no rales.   Abdominal: Soft. Bowel sounds are normal. She exhibits no distension. There is no tenderness. There is no rebound.   Musculoskeletal: Normal range of motion. She exhibits no edema.   Neurological: She is alert and oriented to person, place, and time. No cranial nerve deficit.   Skin: Skin is warm and dry. No erythema.   Psychiatric: She has a normal mood and affect. Her behavior is normal.   Results for GURPREET SHEIKH (MRN [id number]) as of 2017 09:28   1/10/2017    Sodium 133 (L)   Potassium 4.3   Chloride 103   Co2 26   Anion Gap 8 (L)   Glucose 94   Bun 16   Creatinine 0.9   GFR If Non African American >60   Calcium 9.0   AST(SGOT) 16   ALT(SGPT) 23   Alkaline Phosphatase 65   Cholesterol,Tot 208 (H)   Triglycerides 249 (H)   HDL 73.0 (H)   Non HDL Cholesterol 135   LDL 85   Chol-Hdl Ratio 2.85       Stress echo: 17  Patient exercised on treadmill for 4 minutes reached 103% MPHR achieved 7 METs.  Negative stress echocardiogram for ischemia with adequate stress achieved by heart rate criteria    Carotid doppler: 17  No evidence of hemodynamically significant stenosis (zero to 40%).    EK/3/17  Sinus bradycardia 55 bpm, normal axis T-wave inversion anterior leads  Assessment:     No diagnosis found.    Medical Decision Making:  Today's Assessment /  Status / Plan:     1. Most probably benign positional vertigo  If patient has any further episodes of vertigo advised her to try over-the-counter Antivert.  2. Carotid artery Doppler prior to next visit.  3. Given T-wave inversion in anterior leads and complaints of dyspnea on exertion echo and stress echo prior to next visit.  Advised patient to take metoprolol 50 mg BID, other than taking 100 mg daily at bedtime.  4. Continue Lipitor 20 mg qHs.  Labs prior to next visit.  5. Patient's blood pressure is elevated as she did not take her a.m. antihypertensive agents.  Advised her to monitor blood pressure closely at home.  Continue ramipril 10 mg daily.    Follow-up in 6-8 weeks.  Echo, stress echo, carotid Doppler and labs prior to next visit.    Thank you for allowing me to participate in taking care of patient.    Maura Del Cid MD.         Arun Cheema M.D.  [address] [facility] [address]  VIA In Basket

## 2017-06-16 ENCOUNTER — TELEPHONE (OUTPATIENT)
Dept: CARDIOLOGY | Facility: CLINIC | Age: 67
End: 2017-06-16

## 2017-06-16 NOTE — TELEPHONE ENCOUNTER
Patient called [place] office with c/o of high bp and heart rate readings; highest bp was 150/102 and highest heart rate was 104  Please advise, patient would like to know what to do  Phone: [phone]  Thank you

## 2017-06-21 ENCOUNTER — TELEPHONE (OUTPATIENT)
Dept: CARDIOLOGY | Facility: MEDICAL CENTER | Age: 67
End: 2017-06-21

## 2017-06-21 NOTE — TELEPHONE ENCOUNTER
"Patient called to report not feeling well overall and BP readings have been \"all over the place\" they are as follows: 150/90, 143/91, and most recent was 173/106 and that was after laying for half an hour.  She states her oxygen has been between 88-91% but she is following up with her pulmonologist regarding this - because she overall isn't feeling well and her BP is not controlled appointment made for patient to be seen next week and patient will go for lab work this week  - she will call back with any other questions/concerns  "

## 2017-06-25 NOTE — PROGRESS NOTES
Results for GURPREET SHEIKH (MRN [id number]) as of 6/25/2017 11:49   1/10/2017  3/3/2017  6/22/2017    Sodium 133 (L) 138 140   Potassium 4.3 4.3 4.2   Chloride 103 102 103   Co2 26 27 26   Anion Gap 8 (L) 13 15   Glucose 94 91 99   Bun 16 12 15   Creatinine 0.9 0.8 0.9   GFR If Non  >60 >60 >60   Calcium 9.0 8.7 8.8   AST(SGOT) 16 19 26   ALT(SGPT) 23 28 22   Alkaline Phosphatase 65 64 56   Cholesterol,Tot 208 (H)  179   Triglycerides 249 (H)  436 (H)   HDL 73.0 (H)  45.0   Non HDL Cholesterol 135  134   LDL 85  see below

## 2017-06-26 ENCOUNTER — OFFICE VISIT (OUTPATIENT)
Dept: CARDIOLOGY | Facility: PHYSICIAN GROUP | Age: 67
End: 2017-06-26
Payer: MEDICARE

## 2017-06-26 VITALS
HEIGHT: 66 IN | DIASTOLIC BLOOD PRESSURE: 78 MMHG | SYSTOLIC BLOOD PRESSURE: 124 MMHG | HEART RATE: 62 BPM | OXYGEN SATURATION: 90 % | BODY MASS INDEX: 36.8 KG/M2 | WEIGHT: 229 LBS

## 2017-06-26 DIAGNOSIS — Z98.890 H/O CAROTID ENDARTERECTOMY: ICD-10-CM

## 2017-06-26 DIAGNOSIS — I77.9 BILATERAL CAROTID ARTERY DISEASE (HCC): ICD-10-CM

## 2017-06-26 DIAGNOSIS — I10 HYPERTENSION, ESSENTIAL: ICD-10-CM

## 2017-06-26 DIAGNOSIS — E78.5 HYPERLIPIDEMIA, UNSPECIFIED HYPERLIPIDEMIA TYPE: ICD-10-CM

## 2017-06-26 PROCEDURE — 99214 OFFICE O/P EST MOD 30 MIN: CPT | Performed by: NURSE PRACTITIONER

## 2017-06-26 ASSESSMENT — ENCOUNTER SYMPTOMS
BRUISES/BLEEDS EASILY: 0
ORTHOPNEA: 0
FEVER: 0
ABDOMINAL PAIN: 0
DIZZINESS: 0
SHORTNESS OF BREATH: 0
PALPITATIONS: 0
MYALGIAS: 0
LOSS OF CONSCIOUSNESS: 0
CHILLS: 0
PND: 0
INSOMNIA: 1
HEADACHES: 0

## 2017-06-26 NOTE — PROGRESS NOTES
Subjective:   Sylvia Diallo is a 67 y.o. female who presents today for follow-up of elevated BP.    Sylvia is a 67 year old female with history of hypertension, hyperlipidemia, and carotid stenosis status post left CEA, normally followed by Dr. Gates. Over the last couple of weeks, her BP has been going up, running 140-170 systolic over  diastolic. But it has been better the last few days. 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 No chest pain, pressure or discomfort; no palpitations but she does occasionally have some skipped beats; no shortness of breath, orthopnea or PND; no dizziness or syncope; no edema. She doesn't sleep very well. 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    Past Medical History   Diagnosis Date   • Chicken pox    • Heart disease    • Measles    • Mumps    • Rheumatic fever    • Venereal disease      test +   • EBV exposure    • [place] fever      unspecified    • Hypertension    • Carotid disease, bilateral (CMS-[facility]) January 2017     Carotid ultrasound with mild stenosis bilaterally.   • Headache    • Gallbladder disease    • Heart burn    • Hemorrhoids    • Peptic ulcer    • Arthritis    • Back pain    • Anxiety    • Depression    • Pleurisy      Past Surgical History   Procedure Laterality Date   • Abdominal hysterectomy total     • Cervical fusion posterior N/A 1965   • Abdominal exploration       Gall bladder removal   • Pelvic reconstruction laparoscopic N/A 2006   • Carotid endarterectomy Left      Family History   Problem Relation Age of Onset   • Cancer Mother    • Arthritis Mother    • Heart Disease Mother    • Lung Disease Father    • Arthritis Father    • Heart Disease Father      History   Smoking status   • Former Smoker   • Quit date: 06/23/1972   Smokeless tobacco   • Never Used     Allergies   Allergen Reactions   • Darvon [Propoxyphene]    •  Food Unspecified     fish   • Iodine Anaphylaxis     allergeic to shellfish   • Penicillins    • Seasonal    • Shellfish Allergy Anaphylaxis   • Codeine Itching     Outpatient Encounter Prescriptions as of 6/26/2017   Medication Sig Dispense Refill   • tramadol (ULTRAM) 50 MG Tab TAKE ONE TABLET BY MOUTH EVERY 6 HOURS AS NEEDED 120 Tab 0   • atorvastatin (LIPITOR) 20 MG Tab TAKE 1 TABLET EVERY DAY IN THE EVENING 90 Tab 3   • ramipril (ALTACE) 10 MG capsule TAKE 1 CAPSULE EVERY MORNING 90 Cap 3   • citalopram (CELEXA) 10 MG tablet TAKE 1 TABLET EVERY DAY 90 Tab 3   • estradiol (ESTRACE) 1 MG Tab 1.5 tabs daily 135 Tab 3   • gabapentin (NEURONTIN) 800 MG tablet TAKE 1 TABLET THREE TIMES DAILY 270 Tab 3   • metoprolol (LOPRESSOR) 50 MG Tab Take 50 mg by mouth 2 times a day.     • ofloxacin (OCUFLOX) 0.3 % Solution Place 1 Drop in right eye 4 times a day. 1 Bottle 0   • pantoprazole (PROTONIX) 40 MG Tablet Delayed Response TAKE 1 TABLET EVERY DAY 90 Tab 3   • [DISCONTINUED] metoprolol (LOPRESSOR) 100 MG Tab Take 100 mg by mouth every bedtime.     • [DISCONTINUED] metoprolol (LOPRESSOR) 100 MG Tab TAKE ONE TABLET BY MOUTH AT BEDTIME 90 Tab 3   • [DISCONTINUED] metoprolol (LOPRESSOR) 100 MG Tab TAKE 1 TABLET AT BEDTIME 90 Tab 3   • lidocaine (LIDODERM) 5 % Patch Apply 1 Patch to skin as directed every 24 hours. 10 Patch 3   • clindamycin (CLEOCIN) 150 MG Cap 2  Caps 1 hour prior to dental work 2 Cap 0   • [DISCONTINUED] metoprolol (LOPRESSOR) 100 MG Tab TAKE 1 TABLET AT BEDTIME 90 Tab 3   • triamcinolone acetonide (KENALOG) 0.1 % Cream Apply bid prn to affected area 60 g 3   • [DISCONTINUED] lovastatin (MEVACOR) 20 MG Tab 1 at hs 90 Tab 3   • rizatriptan (MAXALT) 10 MG tablet Take 1 Tab by mouth Once PRN for Migraine for up to 1 dose. 10 Tab 3     No facility-administered encounter medications on file as of 6/26/2017.     Review of Systems   Constitutional: Negative for fever and chills.   Respiratory: Negative for  "shortness of breath.    Cardiovascular: Negative for chest pain, palpitations, orthopnea, leg swelling and PND.   Gastrointestinal: Negative for abdominal pain.   Musculoskeletal: Negative for myalgias.   Neurological: Negative for dizziness, loss of consciousness and headaches.   Endo/Heme/Allergies: Does not bruise/bleed easily.   Psychiatric/Behavioral: The patient has insomnia.         Objective:   /78 mmHg  Pulse 62  Ht 1.664 m (5' 5.5\")  Wt 103.874 kg (229 lb)  BMI 37.51 kg/m2  SpO2 90%  LMP 01/01/1976    Physical Exam   Constitutional: She is oriented to person, place, and time. She appears well-developed and well-nourished. No distress.   HENT:   Head: Normocephalic.   Eyes: Conjunctivae and EOM are normal.   Neck: Normal range of motion. Neck supple. No JVD present. No thyromegaly present.   Cardiovascular: Normal rate, regular rhythm, normal heart sounds and intact distal pulses.  Exam reveals no gallop and no friction rub.    No murmur heard.  Pulmonary/Chest: Effort normal and breath sounds normal. No respiratory distress.   Abdominal: Soft. Bowel sounds are normal. There is no tenderness.   Musculoskeletal: Normal range of motion. She exhibits no edema.   Neurological: She is alert and oriented to person, place, and time.   Skin: Skin is warm and dry. No rash noted. No erythema.   Psychiatric: She has a normal mood and affect. Her behavior is normal.     Lab Results   Component Value Date/Time    CHOLESTEROL, 06/22/2017 07:55 AM    LDL see below 06/22/2017 07:55 AM    HDL 45.0 06/22/2017 07:55 AM    TRIGLYCERIDES 436* 06/22/2017 07:55 AM       Lab Results   Component Value Date/Time    SODIUM 140 06/22/2017 07:55 AM    POTASSIUM 4.2 06/22/2017 07:55 AM    CHLORIDE 103 06/22/2017 07:55 AM    CO2 26 06/22/2017 07:55 AM    GLUCOSE 99 06/22/2017 07:55 AM    BUN 15 06/22/2017 07:55 AM    CREATININE 0.9 06/22/2017 07:55 AM     Lab Results   Component Value Date/Time    ALKALINE PHOSPHATASE " 56 06/22/2017 07:55 AM    AST(SGOT) 26 06/22/2017 07:55 AM    ALT(SGPT) 22 06/22/2017 07:55 AM    TOTAL BILIRUBIN 0.5 06/22/2017 07:55 AM          Assessment:     1. Hypertension, essential     2. Hyperlipidemia, unspecified hyperlipidemia type     3. Bilateral carotid artery disease (CMS-HCC)     4. H/O carotid endarterectomy         Medical Decision Making:  Today's Assessment / Status / Plan:     1. Hypertension, treated and stable today. If BP starts going up, can increase Ramipril to 10mg BID. If she needs to do this more often, she can call us for adjustment in her Rx. Continue Metoprolol 50mg BID.    2. Hyperlipidemia, treated with Lipitor. TC is better, but triglycerides are up. Watch carbohydrate intake. May need to add fibrate.    3. History of carotid stenosis, status post left CEA. Carotid ultrasound in January 2017 showed mild stenosis bilaterally.    4. Palpitations, can try OTC Magnesium oxide 400mg once daily.    Monitor BP at home. Call us if it starts to go up again. Keep October 2017 FU with Dr. Gates.    Collaborating MD: Dottie

## 2017-06-26 NOTE — MR AVS SNAPSHOT
"        Sylvia Yoel   2017 10:20 AM   Office Visit   MRN: [id number]    Department:  Heart [facility]   Dept Phone:  [phone]    Description:  Female : 1950   Provider:  JOE Olivas           Reason for Visit     Follow-Up           Allergies as of 2017     Allergen Noted Reactions    Darvon [Propoxyphene] 2015       Food 2015   Unspecified    fish    Iodine 2017   Anaphylaxis    allergeic to shellfish    Penicillins 2015       Seasonal 2015       Shellfish Allergy 2017   Anaphylaxis    Codeine 2016   Itching      Vital Signs     Blood Pressure Pulse Height Weight Body Mass Index Oxygen Saturation    124/78 mmHg 62 1.664 m (5' 5.5\") 103.874 kg (229 lb) 37.51 kg/m2 90%    Last Menstrual Period Smoking Status                1976 Former Smoker          Basic Information     Date Of Birth Sex Race Ethnicity Preferred Language    1950 Female White Non- English      Your appointments     Aug 22, 2017  9:00 AM   30 Minute with Arun Cheema M.D.   [facility] (--)    [address] [facility] [id number] [phone]            Oct 24, 2017  8:40 AM   FOLLOW UP with Maura Gates M.D.   [facility]    [address] [address]  [address]  [facility] [id number] [phone]              Problem List              ICD-10-CM Priority Class Noted - Resolved    Migraine without aura and without status migrainosus, not intractable G43.009   2015 - Present    Bilateral carotid artery disease (CMS-HCC) I77.9   2015 - Present    Hyperlipidemia E78.5   2015 - Present    Hypertension, essential I10   2015 - Present    Osteoarthritis cervical spine M47.812   2015 - Present    Chronic neck pain M54.2, G89.29   2015 - Present    Family hx of colon cancer Z80.0   2015 - Present    History of peptic ulcer disease Z87.11   " 9/29/2015 - Present    History of colon polyps Z86.010   11/4/2015 - Present    Diverticulosis of large intestine without hemorrhage K57.30   11/13/2015 - Present    Internal hemorrhoids K64.8   11/13/2015 - Present    H/O carotid endarterectomy Z98.890   1/3/2017 - Present    Dyslipidemia E78.5   1/3/2017 - Present      Health Maintenance        Date Due Completion Dates    IMM DTaP/Tdap/Td Vaccine (1 - Tdap) 2/26/1967 ---    PAP SMEAR 2/26/1969 ---    MAMMOGRAM 2/26/1988 ---    COLONOSCOPY 2/26/1998 ---    IMM ZOSTER VACCINE 2/26/2008 ---    BONE DENSITY 2/26/2013 ---    IMM PNEUMOCOCCAL 65+ (ADULT) LOW/MEDIUM RISK SERIES (2 of 2 - PPSV23) 3/1/2017 3/1/2016            Current Immunizations     13-VALENT PCV PREVNAR 3/1/2016  9:46 AM    Influenza Vaccine Quad Inj (Preserved) 10/29/2015      Below and/or attached are the medications your provider expects you to take. Review all of your home medications and newly ordered medications with your provider and/or pharmacist. Follow medication instructions as directed by your provider and/or pharmacist. Please keep your medication list with you and share with your provider. Update the information when medications are discontinued, doses are changed, or new medications (including over-the-counter products) are added; and carry medication information at all times in the event of emergency situations     Allergies:  DARVON - (reactions not documented)     FOOD - Unspecified     IODINE - Anaphylaxis     PENICILLINS - (reactions not documented)     SEASONAL - (reactions not documented)     SHELLFISH ALLERGY - Anaphylaxis     CODEINE - Itching               Medications  Valid as of: June 26, 2017 - 10:53 AM    Generic Name Brand Name Tablet Size Instructions for use    Atorvastatin Calcium (Tab) LIPITOR 20 MG TAKE 1 TABLET EVERY DAY IN THE EVENING        Citalopram Hydrobromide (Tab) CELEXA 10 MG TAKE 1 TABLET EVERY DAY        Clindamycin HCl (Cap) CLEOCIN 150 MG 2  Caps 1 hour  prior to dental work        Estradiol (Tab) ESTRACE 1 MG 1.5 tabs daily        Gabapentin (Tab) NEURONTIN 800 MG TAKE 1 TABLET THREE TIMES DAILY        Lidocaine (Patch) LIDODERM 5 % Apply 1 Patch to skin as directed every 24 hours.        Metoprolol Tartrate (Tab) LOPRESSOR 50 MG Take 50 mg by mouth 2 times a day.        Ofloxacin (Solution) OCUFLOX 0.3 % Place 1 Drop in right eye 4 times a day.        Pantoprazole Sodium (Tablet Delayed Response) PROTONIX 40 MG TAKE 1 TABLET EVERY DAY        Ramipril (Cap) ALTACE 10 MG TAKE 1 CAPSULE EVERY MORNING        Rizatriptan Benzoate (Tab) MAXALT 10 MG Take 1 Tab by mouth Once PRN for Migraine for up to 1 dose.        TraMADol HCl (Tab) ULTRAM 50 MG TAKE ONE TABLET BY MOUTH EVERY 6 HOURS AS NEEDED        Triamcinolone Acetonide (Cream) KENALOG 0.1 % Apply bid prn to affected area        .                 Medicines prescribed today were sent to:     [facility] [address] [place] [address] - [facility] [address] [facility] [address]    Phone: [phone] Fax: [phone]    Open 24 Hours?: No    [facility] - [facility] [address]    [address] [facility] [address]    Phone: [phone] Fax: [phone]    Open 24 Hours?: No      Medication refill instructions:       If your prescription bottle indicates you have medication refills left, it is not necessary to call your provider’s office. Please contact your pharmacy and they will refill your medication.    If your prescription bottle indicates you do not have any refills left, you may request refills at any time through one of the following ways: The online [employer] system (except Urgent Care), by calling your provider’s office, or by asking your pharmacy to contact your provider’s office with a refill request. Medication refills are processed only during regular business hours and may not be available until the next business day. Your provider may request additional  information or to have a follow-up visit with you prior to refilling your medication.   *Please Note: Medication refills are assigned a new Rx number when refilled electronically. Your pharmacy may indicate that no refills were authorized even though a new prescription for the same medication is available at the pharmacy. Please request the medicine by name with the pharmacy before contacting your provider for a refill.           [email or web address] Access Code: Activation code not generated  Current [facility] Status: Active

## 2017-06-26 NOTE — Clinical Note
[facility]   [address] [place] [address]  Phone: [phone]  Fax: [phone]              Sylvia Diallo  1950    Encounter Date: 6/26/2017    JOE Olivas          PROGRESS NOTE:  Subjective:   Sylvia Diallo is a 67 y.o. female who presents today for follow-up of elevated BP.    Sylvia is a 67 year old female with history of hypertension, hyperlipidemia, and carotid stenosis status post left CEA, normally followed by Dr. Gates. Over the last couple of weeks, her BP has been going up, running 140-170 systolic over  diastolic. But it has been better the last few days. 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 No chest pain, pressure or discomfort; no palpitations but she does occasionally have some skipped beats; no shortness of breath, orthopnea or PND; no dizziness or syncope; no edema. She doesn't sleep very well. 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    Past Medical History   Diagnosis Date   • Chicken pox    • Heart disease    • Measles    • Mumps    • Rheumatic fever    • Venereal disease      test +   • EBV exposure    • [place] fever      unspecified    • Hypertension    • Carotid disease, bilateral (CMS-[facility]) January 2017     Carotid ultrasound with mild stenosis bilaterally.   • Headache    • Gallbladder disease    • Heart burn    • Hemorrhoids    • Peptic ulcer    • Arthritis    • Back pain    • Anxiety    • Depression    • Pleurisy      Past Surgical History   Procedure Laterality Date   • Abdominal hysterectomy total     • Cervical fusion posterior N/A 1965   • Abdominal exploration       Gall bladder removal   • Pelvic reconstruction laparoscopic N/A 2006   • Carotid endarterectomy Left      Family History   Problem Relation Age of Onset   • Cancer Mother    • Arthritis Mother    •  Heart Disease Mother    • Lung Disease Father    • Arthritis Father    • Heart Disease Father      History   Smoking status   • Former Smoker   • Quit date: 06/23/1972   Smokeless tobacco   • Never Used     Allergies   Allergen Reactions   • Darvon [Propoxyphene]    • Food Unspecified     fish   • Iodine Anaphylaxis     allergeic to shellfish   • Penicillins    • Seasonal    • Shellfish Allergy Anaphylaxis   • Codeine Itching     Outpatient Encounter Prescriptions as of 6/26/2017   Medication Sig Dispense Refill   • tramadol (ULTRAM) 50 MG Tab TAKE ONE TABLET BY MOUTH EVERY 6 HOURS AS NEEDED 120 Tab 0   • atorvastatin (LIPITOR) 20 MG Tab TAKE 1 TABLET EVERY DAY IN THE EVENING 90 Tab 3   • ramipril (ALTACE) 10 MG capsule TAKE 1 CAPSULE EVERY MORNING 90 Cap 3   • citalopram (CELEXA) 10 MG tablet TAKE 1 TABLET EVERY DAY 90 Tab 3   • estradiol (ESTRACE) 1 MG Tab 1.5 tabs daily 135 Tab 3   • gabapentin (NEURONTIN) 800 MG tablet TAKE 1 TABLET THREE TIMES DAILY 270 Tab 3   • metoprolol (LOPRESSOR) 50 MG Tab Take 50 mg by mouth 2 times a day.     • ofloxacin (OCUFLOX) 0.3 % Solution Place 1 Drop in right eye 4 times a day. 1 Bottle 0   • pantoprazole (PROTONIX) 40 MG Tablet Delayed Response TAKE 1 TABLET EVERY DAY 90 Tab 3   • [DISCONTINUED] metoprolol (LOPRESSOR) 100 MG Tab Take 100 mg by mouth every bedtime.     • [DISCONTINUED] metoprolol (LOPRESSOR) 100 MG Tab TAKE ONE TABLET BY MOUTH AT BEDTIME 90 Tab 3   • [DISCONTINUED] metoprolol (LOPRESSOR) 100 MG Tab TAKE 1 TABLET AT BEDTIME 90 Tab 3   • lidocaine (LIDODERM) 5 % Patch Apply 1 Patch to skin as directed every 24 hours. 10 Patch 3   • clindamycin (CLEOCIN) 150 MG Cap 2  Caps 1 hour prior to dental work 2 Cap 0   • [DISCONTINUED] metoprolol (LOPRESSOR) 100 MG Tab TAKE 1 TABLET AT BEDTIME 90 Tab 3   • triamcinolone acetonide (KENALOG) 0.1 % Cream Apply bid prn to affected area 60 g 3   • [DISCONTINUED] lovastatin (MEVACOR) 20 MG Tab 1 at hs 90 Tab 3   • rizatriptan  "(MAXALT) 10 MG tablet Take 1 Tab by mouth Once PRN for Migraine for up to 1 dose. 10 Tab 3     No facility-administered encounter medications on file as of 6/26/2017.     Review of Systems   Constitutional: Negative for fever and chills.   Respiratory: Negative for shortness of breath.    Cardiovascular: Negative for chest pain, palpitations, orthopnea, leg swelling and PND.   Gastrointestinal: Negative for abdominal pain.   Musculoskeletal: Negative for myalgias.   Neurological: Negative for dizziness, loss of consciousness and headaches.   Endo/Heme/Allergies: Does not bruise/bleed easily.   Psychiatric/Behavioral: The patient has insomnia.         Objective:   /78 mmHg  Pulse 62  Ht 1.664 m (5' 5.5\")  Wt 103.874 kg (229 lb)  BMI 37.51 kg/m2  SpO2 90%  LMP 01/01/1976    Physical Exam   Constitutional: She is oriented to person, place, and time. She appears well-developed and well-nourished. No distress.   HENT:   Head: Normocephalic.   Eyes: Conjunctivae and EOM are normal.   Neck: Normal range of motion. Neck supple. No JVD present. No thyromegaly present.   Cardiovascular: Normal rate, regular rhythm, normal heart sounds and intact distal pulses.  Exam reveals no gallop and no friction rub.    No murmur heard.  Pulmonary/Chest: Effort normal and breath sounds normal. No respiratory distress.   Abdominal: Soft. Bowel sounds are normal. There is no tenderness.   Musculoskeletal: Normal range of motion. She exhibits no edema.   Neurological: She is alert and oriented to person, place, and time.   Skin: Skin is warm and dry. No rash noted. No erythema.   Psychiatric: She has a normal mood and affect. Her behavior is normal.     Lab Results   Component Value Date/Time    CHOLESTEROL, 06/22/2017 07:55 AM    LDL see below 06/22/2017 07:55 AM    HDL 45.0 06/22/2017 07:55 AM    TRIGLYCERIDES 436* 06/22/2017 07:55 AM       Lab Results   Component Value Date/Time    SODIUM 140 06/22/2017 07:55 AM    " POTASSIUM 4.2 06/22/2017 07:55 AM    CHLORIDE 103 06/22/2017 07:55 AM    CO2 26 06/22/2017 07:55 AM    GLUCOSE 99 06/22/2017 07:55 AM    BUN 15 06/22/2017 07:55 AM    CREATININE 0.9 06/22/2017 07:55 AM     Lab Results   Component Value Date/Time    ALKALINE PHOSPHATASE 56 06/22/2017 07:55 AM    AST(SGOT) 26 06/22/2017 07:55 AM    ALT(SGPT) 22 06/22/2017 07:55 AM    TOTAL BILIRUBIN 0.5 06/22/2017 07:55 AM          Assessment:     1. Hypertension, essential     2. Hyperlipidemia, unspecified hyperlipidemia type     3. Bilateral carotid artery disease (CMS-HCC)     4. H/O carotid endarterectomy         Medical Decision Making:  Today's Assessment / Status / Plan:     1. Hypertension, treated and stable today. If BP starts going up, can increase Ramipril to 10mg BID. If she needs to do this more often, she can call us for adjustment in her Rx. Continue Metoprolol 50mg BID.    2. Hyperlipidemia, treated with Lipitor. TC is better, but triglycerides are up. Watch carbohydrate intake. May need to add fibrate.    3. History of carotid stenosis, status post left CEA. Carotid ultrasound in January 2017 showed mild stenosis bilaterally.    4. Palpitations, can try OTC Magnesium oxide 400mg once daily.    Monitor BP at home. Call us if it starts to go up again. Keep October 2017 FU with Dr. Gates.    Collaborating MD: Dottie Curry [employer]ients

## 2017-07-10 DIAGNOSIS — E78.5 DYSLIPIDEMIA: ICD-10-CM

## 2017-08-01 ENCOUNTER — TELEPHONE (OUTPATIENT)
Dept: CARDIOLOGY | Facility: MEDICAL CENTER | Age: 67
End: 2017-08-01

## 2017-08-01 NOTE — TELEPHONE ENCOUNTER
----- Message from Brandie Sandoval, Med Ass't sent at 8/1/2017 12:14 PM PDT -----  Regarding: elev bp  Pt bp is 139/85. Wants to know if she should double up htn med when that high. Please call

## 2017-08-01 NOTE — TELEPHONE ENCOUNTER
Patient calling to find out parameters for BP and when to take extra dose of Ramipril.  States last 4 daily readings have been 139/85, 136/86, 134/86, and 154/88.  She took extra dose when BP was 154/88 and has taken it other times as well but thought she should call and get an actual parameter - instructed her per AB to only take extra dose of ramipril if BP higher than 140 systolic and higher than 90 diastolic - also instructed her to call if she finds she is taking extra dose more than 2-3 times a week.  She verbalized understanding and will call with any other questions or concerns.

## 2017-10-31 ENCOUNTER — OFFICE VISIT (OUTPATIENT)
Dept: CARDIOLOGY | Facility: CLINIC | Age: 67
End: 2017-10-31
Payer: MEDICARE

## 2017-10-31 VITALS
OXYGEN SATURATION: 94 % | SYSTOLIC BLOOD PRESSURE: 134 MMHG | HEART RATE: 72 BPM | HEIGHT: 65 IN | BODY MASS INDEX: 37.49 KG/M2 | DIASTOLIC BLOOD PRESSURE: 78 MMHG | WEIGHT: 225 LBS

## 2017-10-31 DIAGNOSIS — Z13.6 SCREENING FOR ABDOMINAL AORTIC ANEURYSM: ICD-10-CM

## 2017-10-31 DIAGNOSIS — Z98.890 H/O CAROTID ENDARTERECTOMY: ICD-10-CM

## 2017-10-31 DIAGNOSIS — I10 HYPERTENSION, ESSENTIAL: ICD-10-CM

## 2017-10-31 DIAGNOSIS — E78.2 MIXED HYPERLIPIDEMIA: ICD-10-CM

## 2017-10-31 PROCEDURE — 99214 OFFICE O/P EST MOD 30 MIN: CPT | Performed by: INTERNAL MEDICINE

## 2017-10-31 ASSESSMENT — ENCOUNTER SYMPTOMS
TINGLING: 1
COUGH: 0
PND: 0
DIZZINESS: 0
HEADACHES: 1
BLOOD IN STOOL: 0
SHORTNESS OF BREATH: 0
BACK PAIN: 1
ABDOMINAL PAIN: 0
DEPRESSION: 1
MYALGIAS: 1
ORTHOPNEA: 0
DOUBLE VISION: 0
FEVER: 0
NERVOUS/ANXIOUS: 1
LOSS OF CONSCIOUSNESS: 0
PALPITATIONS: 0

## 2017-10-31 NOTE — PROGRESS NOTES
Subjective:   Sylvia Diallo is a 66 y.o. female with known history of   1. Hypertension  2. Dyslipidemia  3. Carotid artery disease status post left carotid endarterectomy,  4. History of GI ulcer   Presenting today for follow-up evaluation of hypertension, dyslipidemia and carotid artery disease.    Patient denied any complaints of exertional chest pain pressure or tightness. No complaints of palpitations orthopnea or PND. Denied any complaints of dizziness lightheadedness or LOC. Chronic lower extremity tingling sensation currently on Neurontin. No complaints of lower extremity edema or claudication.    Past Medical History:   Diagnosis Date   • Anxiety    • Arthritis    • Back pain    • Carotid disease, bilateral (CMS-[facility]) January 2017    Carotid ultrasound with mild stenosis bilaterally.   • Chicken pox    • Depression    • EBV exposure    • Gallbladder disease    • Headache(784.0)    • Heart burn    • Heart disease    • Hemorrhoids    • Herpes    • Hypertension    • Measles    • Mumps    • Peptic ulcer    • Pleurisy    • Rheumatic fever    • [place] fever     unspecified    • Sleep apnea    • Stomach ulcer    • Venereal disease     test +     Past Surgical History:   Procedure Laterality Date   • PELVIC RECONSTRUCTION LAPAROSCOPIC N/A 2006   • CERVICAL FUSION POSTERIOR N/A 1965   • ABDOMINAL EXPLORATION      Gall bladder removal   • ABDOMINAL HYSTERECTOMY TOTAL     • CAROTID ENDARTERECTOMY Left      Family History   Problem Relation Age of Onset   • Cancer Mother    • Arthritis Mother    • Heart Disease Mother    • Lung Disease Father    • Arthritis Father    • Heart Disease Father      History   Smoking Status   • Former Smoker   • Quit date: 6/23/1972   Smokeless Tobacco   • Never Used     Allergies   Allergen Reactions   • Darvon [Propoxyphene]    • Food Unspecified     fish   • Iodine Anaphylaxis     allergeic to shellfish   • Penicillins    • Seasonal    • Shellfish Allergy Anaphylaxis   • Codeine  "Itching     Outpatient Encounter Prescriptions as of 10/31/2017   Medication Sig Dispense Refill   • Cetirizine HCl (ZYRTEC ALLERGY PO) Take  by mouth.     • NON SPECIFIED SLEEP APNEA MACHINE     • ramipril (ALTACE) 10 MG capsule TAKE 1 CAPSULE EVERY MORNING 90 Cap 3   • citalopram (CELEXA) 10 MG tablet TAKE 1 TABLET EVERY DAY 90 Tab 3   • estradiol (ESTRACE) 1 MG Tab 1.5 tabs daily 135 Tab 3   • gabapentin (NEURONTIN) 800 MG tablet TAKE 1 TABLET THREE TIMES DAILY 270 Tab 3   • metoprolol (LOPRESSOR) 50 MG Tab Take 50 mg by mouth 2 times a day.     • pantoprazole (PROTONIX) 40 MG Tablet Delayed Response TAKE 1 TABLET EVERY DAY 90 Tab 3   • tramadol (ULTRAM) 50 MG Tab Take 1 Tab by mouth every 6 hours as needed. 120 Tab 0   • clindamycin (CLEOCIN) 150 MG Cap 2  Caps 1 hour prior to dental work 2 Cap 0     No facility-administered encounter medications on file as of 10/31/2017.      Review of Systems   Constitutional: Negative for fever (when EBV titiers goes up) and malaise/fatigue.   HENT: Positive for tinnitus.    Eyes: Negative for double vision.        Decreased vision in right eye   Respiratory: Negative for cough and shortness of breath.    Cardiovascular: Negative for chest pain, palpitations, orthopnea, leg swelling and PND.   Gastrointestinal: Negative for abdominal pain and blood in stool.   Genitourinary: Negative for hematuria.   Musculoskeletal: Positive for back pain, joint pain and myalgias.   Skin: Negative for itching and rash.   Neurological: Positive for tingling and headaches. Negative for dizziness and loss of consciousness.   Psychiatric/Behavioral: Positive for depression. Negative for suicidal ideas. The patient is nervous/anxious.         Objective:   /78   Pulse 72   Ht 1.651 m (5' 5\")   Wt 102.1 kg (225 lb)   LMP 01/01/1976   SpO2 94%   BMI 37.44 kg/m²     Physical Exam   Constitutional: She is oriented to person, place, and time. She appears well-developed and well-nourished. "   HENT:   Head: Normocephalic and atraumatic.   Eyes: Conjunctivae are normal. Pupils are equal, round, and reactive to light. Right eye exhibits no discharge. Left eye exhibits no discharge.   Neck: No JVD present. No tracheal deviation present.   Cardiovascular: Normal rate and regular rhythm.    No murmur heard.  Pulses:       Carotid pulses are 2+ on the right side, and 2+ on the left side.       Radial pulses are 2+ on the right side, and 2+ on the left side.        Dorsalis pedis pulses are 2+ on the right side, and 2+ on the left side.        Posterior tibial pulses are 2+ on the right side, and 2+ on the left side.   Pulmonary/Chest: Effort normal and breath sounds normal. No respiratory distress. She has no wheezes. She has no rales.   Abdominal: Soft. Bowel sounds are normal. She exhibits no distension. There is no tenderness. There is no rebound.   Musculoskeletal: Normal range of motion. She exhibits no edema.   Neurological: She is alert and oriented to person, place, and time. No cranial nerve deficit.   Skin: Skin is warm and dry. No erythema.   Psychiatric: She has a normal mood and affect. Her behavior is normal.   Results for GURPREET SHEIKH (MRN [id number]) as of 6/25/2017 11:49   1/10/2017  3/3/2017  6/22/2017    Sodium 133 (L) 138 140   Potassium 4.3 4.3 4.2   Chloride 103 102 103   Co2 26 27 26   Anion Gap 8 (L) 13 15   Glucose 94 91 99   Bun 16 12 15   Creatinine 0.9 0.8 0.9   GFR If Non  >60 >60 >60   Calcium 9.0 8.7 8.8   AST(SGOT) 16 19 26   ALT(SGPT) 23 28 22   Alkaline Phosphatase 65 64 56   Cholesterol,Tot 208 (H)  179   Triglycerides 249 (H)  436 (H)   HDL 73.0 (H)  45.0   Non HDL Cholesterol 135  134   LDL 85  see below     Stress echo: 1/30/17  Patient exercised on treadmill for 4 minutes reached 103% MPHR achieved 7 METs.  Negative stress echocardiogram for ischemia with adequate stress achieved by heart rate criteria    Carotid doppler: 1/5/17  No evidence of  hemodynamically significant stenosis (zero to 40%).    EK/3/17  Sinus bradycardia 55 bpm, normal axis T-wave inversion anterior leads  Assessment:     1. Screening for abdominal aortic aneurysm  2. HTN  3. Dyslipidemia  4. Carotid artery disease    Medical Decision Making:  Today's Assessment / Status / Plan:     1. Abdominal aortic Doppler prior to next visit.  2. Blood pressure well controlled at the present visit.  Continue ramipril 10 mg daily if systolic blood pressure more than 150/90 okay to take extra ramipril 5 mg.  3. Check lipid panel prior to next visit  4. Carotid artery Doppler prior to next visit.    Abdominal aortic Doppler carotid artery Doppler prior to next visit.  Labs now.  If patient still has hypertriglyceridemia will start her on Lopid.    Thank you for allowing me to participate in taking care of patient.    Maura Del Cid MD.

## 2017-10-31 NOTE — LETTER
[facility] [address],   [address] [place] [address]  Phone: [phone]  Fax: [phone]              ySlvia Diallo  1950    Encounter Date: 10/31/2017    Maura Gates M.D.          PROGRESS NOTE:  Subjective:   Sylvia Diallo is a 66 y.o. female with known history of   1. Hypertension  2. Dyslipidemia  3. Carotid artery disease status post left carotid endarterectomy,  4. History of GI ulcer   Presenting today for follow-up evaluation of hypertension, dyslipidemia and carotid artery disease.    Patient denied any complaints of exertional chest pain pressure or tightness. No complaints of palpitations orthopnea or PND. Denied any complaints of dizziness lightheadedness or LOC. Chronic lower extremity tingling sensation currently on Neurontin. No complaints of lower extremity edema or claudication.    Past Medical History:   Diagnosis Date   • Anxiety    • Arthritis    • Back pain    • Carotid disease, bilateral (CMS-HCC) January 2017    Carotid ultrasound with mild stenosis bilaterally.   • Chicken pox    • Depression    • EBV exposure    • Gallbladder disease    • Headache(784.0)    • Heart burn    • Heart disease    • Hemorrhoids    • Herpes    • Hypertension    • Measles    • Mumps    • Peptic ulcer    • Pleurisy    • Rheumatic fever    • [place] fever     unspecified    • Sleep apnea    • Stomach ulcer    • Venereal disease     test +     Past Surgical History:   Procedure Laterality Date   • PELVIC RECONSTRUCTION LAPAROSCOPIC N/A 2006   • CERVICAL FUSION POSTERIOR N/A 1965   • ABDOMINAL EXPLORATION      Gall bladder removal   • ABDOMINAL HYSTERECTOMY TOTAL     • CAROTID ENDARTERECTOMY Left      Family History   Problem Relation Age of Onset   • Cancer Mother    • Arthritis Mother    • Heart Disease Mother    • Lung Disease Father    • Arthritis Father    • Heart Disease Father      History   Smoking Status   • Former  Smoker   • Quit date: 6/23/1972   Smokeless Tobacco   • Never Used     Allergies   Allergen Reactions   • Darvon [Propoxyphene]    • Food Unspecified     fish   • Iodine Anaphylaxis     allergeic to shellfish   • Penicillins    • Seasonal    • Shellfish Allergy Anaphylaxis   • Codeine Itching     Outpatient Encounter Prescriptions as of 10/31/2017   Medication Sig Dispense Refill   • Cetirizine HCl (ZYRTEC ALLERGY PO) Take  by mouth.     • NON SPECIFIED SLEEP APNEA MACHINE     • ramipril (ALTACE) 10 MG capsule TAKE 1 CAPSULE EVERY MORNING 90 Cap 3   • citalopram (CELEXA) 10 MG tablet TAKE 1 TABLET EVERY DAY 90 Tab 3   • estradiol (ESTRACE) 1 MG Tab 1.5 tabs daily 135 Tab 3   • gabapentin (NEURONTIN) 800 MG tablet TAKE 1 TABLET THREE TIMES DAILY 270 Tab 3   • metoprolol (LOPRESSOR) 50 MG Tab Take 50 mg by mouth 2 times a day.     • pantoprazole (PROTONIX) 40 MG Tablet Delayed Response TAKE 1 TABLET EVERY DAY 90 Tab 3   • tramadol (ULTRAM) 50 MG Tab Take 1 Tab by mouth every 6 hours as needed. 120 Tab 0   • clindamycin (CLEOCIN) 150 MG Cap 2  Caps 1 hour prior to dental work 2 Cap 0     No facility-administered encounter medications on file as of 10/31/2017.      Review of Systems   Constitutional: Negative for fever (when EBV titiers goes up) and malaise/fatigue.   HENT: Positive for tinnitus.    Eyes: Negative for double vision.        Decreased vision in right eye   Respiratory: Negative for cough and shortness of breath.    Cardiovascular: Negative for chest pain, palpitations, orthopnea, leg swelling and PND.   Gastrointestinal: Negative for abdominal pain and blood in stool.   Genitourinary: Negative for hematuria.   Musculoskeletal: Positive for back pain, joint pain and myalgias.   Skin: Negative for itching and rash.   Neurological: Positive for tingling and headaches. Negative for dizziness and loss of consciousness.   Psychiatric/Behavioral: Positive for depression. Negative for suicidal ideas. The patient  "is nervous/anxious.         Objective:   /78   Pulse 72   Ht 1.651 m (5' 5\")   Wt 102.1 kg (225 lb)   LMP 01/01/1976   SpO2 94%   BMI 37.44 kg/m²      Physical Exam   Constitutional: She is oriented to person, place, and time. She appears well-developed and well-nourished.   HENT:   Head: Normocephalic and atraumatic.   Eyes: Conjunctivae are normal. Pupils are equal, round, and reactive to light. Right eye exhibits no discharge. Left eye exhibits no discharge.   Neck: No JVD present. No tracheal deviation present.   Cardiovascular: Normal rate and regular rhythm.    No murmur heard.  Pulses:       Carotid pulses are 2+ on the right side, and 2+ on the left side.       Radial pulses are 2+ on the right side, and 2+ on the left side.        Dorsalis pedis pulses are 2+ on the right side, and 2+ on the left side.        Posterior tibial pulses are 2+ on the right side, and 2+ on the left side.   Pulmonary/Chest: Effort normal and breath sounds normal. No respiratory distress. She has no wheezes. She has no rales.   Abdominal: Soft. Bowel sounds are normal. She exhibits no distension. There is no tenderness. There is no rebound.   Musculoskeletal: Normal range of motion. She exhibits no edema.   Neurological: She is alert and oriented to person, place, and time. No cranial nerve deficit.   Skin: Skin is warm and dry. No erythema.   Psychiatric: She has a normal mood and affect. Her behavior is normal.   Results for GURPREET SHEIKH (MRN [id number]) as of 6/25/2017 11:49   1/10/2017  3/3/2017  6/22/2017    Sodium 133 (L) 138 140   Potassium 4.3 4.3 4.2   Chloride 103 102 103   Co2 26 27 26   Anion Gap 8 (L) 13 15   Glucose 94 91 99   Bun 16 12 15   Creatinine 0.9 0.8 0.9   GFR If Non  >60 >60 >60   Calcium 9.0 8.7 8.8   AST(SGOT) 16 19 26   ALT(SGPT) 23 28 22   Alkaline Phosphatase 65 64 56   Cholesterol,Tot 208 (H)  179   Triglycerides 249 (H)  436 (H)   HDL 73.0 (H)  45.0   Non HDL Cholesterol " 135  134   LDL 85  see below     Stress echo: 17  Patient exercised on treadmill for 4 minutes reached 103% MPHR achieved 7 METs.  Negative stress echocardiogram for ischemia with adequate stress achieved by heart rate criteria    Carotid doppler: 17  No evidence of hemodynamically significant stenosis (zero to 40%).    EK/3/17  Sinus bradycardia 55 bpm, normal axis T-wave inversion anterior leads  Assessment:     1. Screening for abdominal aortic aneurysm  2. HTN  3. Dyslipidemia  4. Carotid artery disease    Medical Decision Making:  Today's Assessment / Status / Plan:     1. Abdominal aortic Doppler prior to next visit.  2. Blood pressure well controlled at the present visit.  Continue ramipril 10 mg daily if systolic blood pressure more than 150/90 okay to take extra ramipril 5 mg.  3. Check lipid panel prior to next visit  4. Carotid artery Doppler prior to next visit.    Abdominal aortic Doppler carotid artery Doppler prior to next visit.  Labs now.  If patient still has hypertriglyceridemia will start her on Lopid.    Thank you for allowing me to participate in taking care of patient.    Maura Del Cid MD.      Arun Cheema M.D.  [address] [facility] [address]  VIA In Basket

## 2017-11-01 NOTE — PROGRESS NOTES
Results for GURPREET SHEIKH (MRN [id number]) as of 11/1/2017 15:54   6/22/2017 11/1/2017    Sodium 140    Potassium 4.2    Chloride 103    Co2 26    Anion Gap 15    Glucose 99    Bun 15    Creatinine 0.9    GFR If Non African American >60    Calcium 8.8    AST(SGOT) 26    ALT(SGPT) 22    Alkaline Phosphatase 56    Cholesterol,Tot 179 189   Triglycerides 436 (H) 353 (H)   HDL 45.0 59.0   Non HDL Cholesterol 134 130   LDL see below 59     Carotid doppler: 11/16/17  No evidence of hemodynamically significant stenosis (zero to 40%).    Abdominal aortic doppler: 11/16/17  Aneurysm dilatation of the proximal abdominal aorta at 2.7 x 2.7 cm. Recommend follow-up in one year

## 2017-11-03 ENCOUNTER — TELEPHONE (OUTPATIENT)
Dept: CARDIOLOGY | Facility: MEDICAL CENTER | Age: 67
End: 2017-11-03

## 2017-11-03 DIAGNOSIS — E78.1 HYPERTRIGLYCERIDEMIA: ICD-10-CM

## 2017-11-03 DIAGNOSIS — E78.2 MIXED HYPERLIPIDEMIA: ICD-10-CM

## 2017-11-03 RX ORDER — GEMFIBROZIL 600 MG/1
600 TABLET, FILM COATED ORAL 2 TIMES DAILY
Qty: 180 TAB | Refills: 3 | Status: ON HOLD | OUTPATIENT
Start: 2017-11-03 | End: 2017-11-06

## 2017-11-03 NOTE — TELEPHONE ENCOUNTER
Spoke with patient and discussed lab results and informed her of AM advisement and she is agreeable to starting Lopid 600mg BID and repeating labs in 2 months. [facility] and lab slip mailed. She will call back with any questions or concerns       Message   Received: 2 days ago   Message Contents   Maura Gates M.D.  Michael Pabon             Patient's triglyceride levels are still elevated.   If she is agreeable start Lopid 600 mg BID   Recheck LFTs and lipid panel in 2 months   Maura

## 2017-11-16 DIAGNOSIS — E78.5 DYSLIPIDEMIA: ICD-10-CM

## 2017-11-16 RX ORDER — GEMFIBROZIL 600 MG/1
600 TABLET, FILM COATED ORAL 2 TIMES DAILY
Qty: 180 TAB | Refills: 3 | Status: SHIPPED | OUTPATIENT
Start: 2017-11-16 | End: 2018-05-21 | Stop reason: SDUPTHER

## 2017-12-08 ENCOUNTER — TELEPHONE (OUTPATIENT)
Dept: CARDIOLOGY | Facility: MEDICAL CENTER | Age: 67
End: 2017-12-08

## 2017-12-08 NOTE — TELEPHONE ENCOUNTER
"----- Message from Philly Ron sent at 12/8/2017  9:51 AM PST -----  Regarding: med advice  Contact: [phone]  AM/narinder    Pt calling to ask if the medication, \"gemfibrozil (LOPID) 600 MG\" is the one that AM told her would control her triglycerides.  Please call pt to advise   "

## 2018-02-12 ENCOUNTER — OFFICE VISIT (OUTPATIENT)
Dept: CARDIOLOGY | Facility: CLINIC | Age: 68
End: 2018-02-12
Payer: MEDICARE

## 2018-02-12 VITALS
WEIGHT: 224 LBS | SYSTOLIC BLOOD PRESSURE: 104 MMHG | HEART RATE: 68 BPM | OXYGEN SATURATION: 92 % | BODY MASS INDEX: 37.32 KG/M2 | HEIGHT: 65 IN | DIASTOLIC BLOOD PRESSURE: 68 MMHG

## 2018-02-12 DIAGNOSIS — I77.9 BILATERAL CAROTID ARTERY DISEASE (HCC): ICD-10-CM

## 2018-02-12 DIAGNOSIS — E78.1 HYPERTRIGLYCERIDEMIA: ICD-10-CM

## 2018-02-12 DIAGNOSIS — I77.819 ECTATIC AORTA (HCC): ICD-10-CM

## 2018-02-12 PROCEDURE — 99214 OFFICE O/P EST MOD 30 MIN: CPT | Performed by: INTERNAL MEDICINE

## 2018-02-12 RX ORDER — EZETIMIBE 10 MG/1
10 TABLET ORAL DAILY
Qty: 30 TAB | Refills: 11 | Status: SHIPPED | OUTPATIENT
Start: 2018-02-12 | End: 2018-10-02 | Stop reason: SDUPTHER

## 2018-02-12 ASSESSMENT — ENCOUNTER SYMPTOMS
BACK PAIN: 1
ABDOMINAL PAIN: 0
DIZZINESS: 0
HEADACHES: 0
TINGLING: 1
SHORTNESS OF BREATH: 0
BLURRED VISION: 1
PND: 0
ORTHOPNEA: 0
DEPRESSION: 1
LOSS OF CONSCIOUSNESS: 0
FEVER: 0
DOUBLE VISION: 0
BLOOD IN STOOL: 0
MYALGIAS: 0
COUGH: 0
PALPITATIONS: 0
NERVOUS/ANXIOUS: 1

## 2018-02-12 NOTE — LETTER
[facility] [address],   [address] [place] [address]  Phone: [phone]  Fax: [phone]              Sylvia Diallo  1950    Encounter Date: 2/12/2018    Maura Gates M.D.          PROGRESS NOTE:  Subjective:   Sylvia Diallo is a 66 y.o. female with known history of   1. Hypertension  2. Dyslipidemia  3. Carotid artery disease status post left carotid endarterectomy,  4. History of GI ulcer   5. ZACKARY on CPAP  Presenting today for follow-up evaluation of hypertension, dyslipidemia and carotid artery disease.    Patient complaining about unable to walk more than one fourth of a mile without getting lower leg cramps. No complaints of exertional chest pain pressure or tightness. Able to tolerate Lopid but triglyceride levels are still elevated. Denied any complaints of dizziness lightheadedness or LOC. No complaints of lower extremity edema.    Past Medical History:   Diagnosis Date   • Anxiety    • Anxiety    • Arthritis    • Back pain    • Blind right eye    • Carotid disease, bilateral (CMS-HCC) January 2017    Carotid ultrasound with mild stenosis bilaterally.   • Chicken pox    • Depression    • EBV exposure    • Gallbladder disease    • Headache(784.0)    • Heart burn    • Heart disease    • Hemorrhoids    • Herpes    • Hypertension    • Measles    • Migraine    • Mumps    • Peptic ulcer    • Pleurisy    • Rheumatic fever    • [place] fever     unspecified    • Sleep apnea    • Sleep apnea with use of continuous positive airway pressure (CPAP)    • Stomach ulcer    • Transfusion history    • Venereal disease     test +     Past Surgical History:   Procedure Laterality Date   • EXTENSOR TENDON REPAIR Right 11/6/2017    Procedure: RT EXTENSOR TENDON REPAIR INDICIS PROPRIUS TO EXTENSOR POLLICIS LONGUS TRANSFER, POSSIBLE PALMARIS LONGUS HARVEST AND TRANSFER;  Surgeon: Tahir Garnett M.D.;  Location: SURGERY [facility]  ORS;  Service: Orthopedics   • COLONOSCOPY  2016   • KNEE REPLACEMENT, TOTAL Bilateral 2014   • PELVIC RECONSTRUCTION LAPAROSCOPIC N/A 2006   • OTHER  1993    R eye lense   • CHOLECYSTECTOMY  1978   • CERVICAL FUSION POSTERIOR N/A 1965   • ABDOMINAL EXPLORATION      Gall bladder removal   • ABDOMINAL HYSTERECTOMY TOTAL     • CAROTID ENDARTERECTOMY Left    • OTHER ORTHOPEDIC SURGERY      R shoulder spinal fusion     Family History   Problem Relation Age of Onset   • Cancer Mother    • Arthritis Mother    • Heart Disease Mother    • Lung Disease Father    • Arthritis Father    • Heart Disease Father      History   Smoking Status   • Former Smoker   • Quit date: 6/23/1972   Smokeless Tobacco   • Never Used     Allergies   Allergen Reactions   • Darvon [Propoxyphene]    • Food Unspecified     fish   • Iodine Anaphylaxis     allergeic to shellfish   • Penicillins    • Seasonal    • Shellfish Allergy Anaphylaxis   • Codeine Itching     Outpatient Encounter Prescriptions as of 2/12/2018   Medication Sig Dispense Refill   • MELATONIN PO Take  by mouth.     • gemfibrozil (LOPID) 600 MG Tab Take 1 Tab by mouth 2 times a day. 180 Tab 3   • Cetirizine HCl (ZYRTEC ALLERGY PO) Take  by mouth.     • NON SPECIFIED SLEEP APNEA MACHINE     • tramadol (ULTRAM) 50 MG Tab Take 1 Tab by mouth every 6 hours as needed. 120 Tab 0   • ramipril (ALTACE) 10 MG capsule TAKE 1 CAPSULE EVERY MORNING 90 Cap 3   • citalopram (CELEXA) 10 MG tablet TAKE 1 TABLET EVERY DAY 90 Tab 3   • estradiol (ESTRACE) 1 MG Tab 1.5 tabs daily 135 Tab 3   • gabapentin (NEURONTIN) 800 MG tablet TAKE 1 TABLET THREE TIMES DAILY 270 Tab 3   • metoprolol (LOPRESSOR) 50 MG Tab Take 50 mg by mouth 2 times a day.     • pantoprazole (PROTONIX) 40 MG Tablet Delayed Response TAKE 1 TABLET EVERY DAY 90 Tab 3     No facility-administered encounter medications on file as of 2/12/2018.      Review of Systems   Constitutional: Negative for fever (when EBV titiers goes up) and  "malaise/fatigue.   HENT: Positive for tinnitus.    Eyes: Positive for blurred vision (Right eye). Negative for double vision.        Decreased vision in right eye   Respiratory: Negative for cough and shortness of breath.    Cardiovascular: Negative for chest pain, palpitations, orthopnea, leg swelling and PND.   Gastrointestinal: Negative for abdominal pain and blood in stool.   Genitourinary: Negative for hematuria.   Musculoskeletal: Positive for back pain and joint pain. Negative for myalgias.   Skin: Negative for itching and rash.   Neurological: Positive for tingling. Negative for dizziness, loss of consciousness and headaches.   Psychiatric/Behavioral: Positive for depression. Negative for suicidal ideas. The patient is nervous/anxious.         Objective:   /68   Pulse 68   Ht 1.651 m (5' 5\")   Wt 101.6 kg (224 lb)   LMP 01/01/1976   SpO2 92%   BMI 37.28 kg/m²      Physical Exam   Constitutional: She is oriented to person, place, and time. She appears well-developed and well-nourished.   HENT:   Head: Normocephalic and atraumatic.   Eyes: Conjunctivae are normal. Pupils are equal, round, and reactive to light. Right eye exhibits no discharge. Left eye exhibits no discharge.   Neck: No JVD present. No tracheal deviation present.   Cardiovascular: Normal rate and regular rhythm.    No murmur heard.  Pulses:       Carotid pulses are 2+ on the right side, and 2+ on the left side.       Radial pulses are 2+ on the right side, and 2+ on the left side.        Dorsalis pedis pulses are 2+ on the right side, and 2+ on the left side.   Pulmonary/Chest: Effort normal and breath sounds normal. No respiratory distress. She has no wheezes. She has no rales.   Abdominal: Soft. Bowel sounds are normal. She exhibits no distension. There is no tenderness. There is no rebound.   Musculoskeletal: Normal range of motion. She exhibits no edema.   Neurological: She is alert and oriented to person, place, and time. No " cranial nerve deficit.   Skin: Skin is warm and dry. No erythema.   Psychiatric: She has a normal mood and affect. Her behavior is normal.   Results for GURPREET SHEIKH (MRN [id number]) as of 2018 08:46   2017  11/3/2017  2018    Sodium  136    Potassium  4.1    Chloride  103    Co2  25    Anion Gap  12    Glucose  103 (H)    Bun  12    Creatinine  0.8    GFR If Non African American  >60    Calcium  8.9    AST(SGOT)  20    ALT(SGPT)  20    Alkaline Phosphatase  89    Cholesterol,Tot 189  194   Triglycerides 353 (H)  390 (H)   HDL 59.0  58.0   Non HDL Cholesterol 130  136   LDL 59  58     Abdominal aortic doppler: 17  Aneurysm dilatation of the proximal abdominal aorta at 2.7 x 2.7 cm. Recommend follow-up in one year    Carotid doppler: 17  No evidence of hemodynamically significant stenosis (zero to 40%).No evidence of hemodynamically significant stenosis (zero to 40%).    Stress echo: 17  Patient exercised on treadmill for 4 minutes reached 103% MPHR achieved 7 METs.  Negative stress echocardiogram for ischemia with adequate stress achieved by heart rate criteria    EK/3/17  Sinus bradycardia 55 bpm, normal axis T-wave inversion anterior leads  Assessment:     1. Screening for abdominal aortic aneurysm  2. HTN  3. Dyslipidemia  4. Carotid artery disease    Medical Decision Making:  Today's Assessment / Status / Plan:     1. Radiology report stated abdominal aortic diameter is less than 3 cm but still has presence of aneurysm?? Unfortunately I can't pull up the images to review the pictures.  Will repeat abdominal aortic Doppler in one to reassess the size again.  2. Blood pressure well controlled at the present visit.  Continue ramipril 10 mg daily.  3. Referral to nutrition.  Start Zetia 10 mg daily  Continue Lopid 600 mg BID.  LDL less than 70.  4. Carotid artery Doppler prior to next visit.    Abdominal aortic Doppler and carotid Doppler in 2018. Labs in 2-3  months.  Referral to nutrition  Follow-up in one year    Thank you for allowing me to participate in taking care of patient.    Maura Gates. MD.      Arun Cheema M.D.  [address] [facility] [address]  VIA In Basket

## 2018-02-12 NOTE — PROGRESS NOTES
Subjective:   Sylvia Diallo is a 66 y.o. female with known history of   1. Hypertension  2. Dyslipidemia  3. Carotid artery disease status post left carotid endarterectomy,  4. History of GI ulcer   5. ZACKARY on CPAP  Presenting today for follow-up evaluation of hypertension, dyslipidemia and carotid artery disease.    Patient complaining about unable to walk more than one fourth of a mile without getting lower leg cramps. No complaints of exertional chest pain pressure or tightness. Able to tolerate Lopid but triglyceride levels are still elevated. Denied any complaints of dizziness lightheadedness or LOC. No complaints of lower extremity edema.    Past Medical History:   Diagnosis Date   • Anxiety    • Anxiety    • Arthritis    • Back pain    • Blind right eye    • Carotid disease, bilateral (CMS-[facility]) January 2017    Carotid ultrasound with mild stenosis bilaterally.   • Chicken pox    • Depression    • EBV exposure    • Gallbladder disease    • Headache(784.0)    • Heart burn    • Heart disease    • Hemorrhoids    • Herpes    • Hypertension    • Measles    • Migraine    • Mumps    • Peptic ulcer    • Pleurisy    • Rheumatic fever    • [place] fever     unspecified    • Sleep apnea    • Sleep apnea with use of continuous positive airway pressure (CPAP)    • Stomach ulcer    • Transfusion history    • Venereal disease     test +     Past Surgical History:   Procedure Laterality Date   • EXTENSOR TENDON REPAIR Right 11/6/2017    Procedure: RT EXTENSOR TENDON REPAIR INDICIS PROPRIUS TO EXTENSOR POLLICIS LONGUS TRANSFER, POSSIBLE PALMARIS LONGUS HARVEST AND TRANSFER;  Surgeon: Tahir Garnett M.D.;  Location: SURGERY [facility];  Service: Orthopedics   • COLONOSCOPY  2016   • KNEE REPLACEMENT, TOTAL Bilateral 2014   • PELVIC RECONSTRUCTION LAPAROSCOPIC N/A 2006   • OTHER  1993    R eye lense   • CHOLECYSTECTOMY  1978   • CERVICAL FUSION POSTERIOR N/A 1965   • ABDOMINAL EXPLORATION      Gall bladder removal    • ABDOMINAL HYSTERECTOMY TOTAL     • CAROTID ENDARTERECTOMY Left    • OTHER ORTHOPEDIC SURGERY      R shoulder spinal fusion     Family History   Problem Relation Age of Onset   • Cancer Mother    • Arthritis Mother    • Heart Disease Mother    • Lung Disease Father    • Arthritis Father    • Heart Disease Father      History   Smoking Status   • Former Smoker   • Quit date: 6/23/1972   Smokeless Tobacco   • Never Used     Allergies   Allergen Reactions   • Darvon [Propoxyphene]    • Food Unspecified     fish   • Iodine Anaphylaxis     allergeic to shellfish   • Penicillins    • Seasonal    • Shellfish Allergy Anaphylaxis   • Codeine Itching     Outpatient Encounter Prescriptions as of 2/12/2018   Medication Sig Dispense Refill   • MELATONIN PO Take  by mouth.     • gemfibrozil (LOPID) 600 MG Tab Take 1 Tab by mouth 2 times a day. 180 Tab 3   • Cetirizine HCl (ZYRTEC ALLERGY PO) Take  by mouth.     • NON SPECIFIED SLEEP APNEA MACHINE     • tramadol (ULTRAM) 50 MG Tab Take 1 Tab by mouth every 6 hours as needed. 120 Tab 0   • ramipril (ALTACE) 10 MG capsule TAKE 1 CAPSULE EVERY MORNING 90 Cap 3   • citalopram (CELEXA) 10 MG tablet TAKE 1 TABLET EVERY DAY 90 Tab 3   • estradiol (ESTRACE) 1 MG Tab 1.5 tabs daily 135 Tab 3   • gabapentin (NEURONTIN) 800 MG tablet TAKE 1 TABLET THREE TIMES DAILY 270 Tab 3   • metoprolol (LOPRESSOR) 50 MG Tab Take 50 mg by mouth 2 times a day.     • pantoprazole (PROTONIX) 40 MG Tablet Delayed Response TAKE 1 TABLET EVERY DAY 90 Tab 3     No facility-administered encounter medications on file as of 2/12/2018.      Review of Systems   Constitutional: Negative for fever (when EBV titiers goes up) and malaise/fatigue.   HENT: Positive for tinnitus.    Eyes: Positive for blurred vision (Right eye). Negative for double vision.        Decreased vision in right eye   Respiratory: Negative for cough and shortness of breath.    Cardiovascular: Negative for chest pain, palpitations, orthopnea,  "leg swelling and PND.   Gastrointestinal: Negative for abdominal pain and blood in stool.   Genitourinary: Negative for hematuria.   Musculoskeletal: Positive for back pain and joint pain. Negative for myalgias.   Skin: Negative for itching and rash.   Neurological: Positive for tingling. Negative for dizziness, loss of consciousness and headaches.   Psychiatric/Behavioral: Positive for depression. Negative for suicidal ideas. The patient is nervous/anxious.         Objective:   /68   Pulse 68   Ht 1.651 m (5' 5\")   Wt 101.6 kg (224 lb)   LMP 01/01/1976   SpO2 92%   BMI 37.28 kg/m²     Physical Exam   Constitutional: She is oriented to person, place, and time. She appears well-developed and well-nourished.   HENT:   Head: Normocephalic and atraumatic.   Eyes: Conjunctivae are normal. Pupils are equal, round, and reactive to light. Right eye exhibits no discharge. Left eye exhibits no discharge.   Neck: No JVD present. No tracheal deviation present.   Cardiovascular: Normal rate and regular rhythm.    No murmur heard.  Pulses:       Carotid pulses are 2+ on the right side, and 2+ on the left side.       Radial pulses are 2+ on the right side, and 2+ on the left side.        Dorsalis pedis pulses are 2+ on the right side, and 2+ on the left side.   Pulmonary/Chest: Effort normal and breath sounds normal. No respiratory distress. She has no wheezes. She has no rales.   Abdominal: Soft. Bowel sounds are normal. She exhibits no distension. There is no tenderness. There is no rebound.   Musculoskeletal: Normal range of motion. She exhibits no edema.   Neurological: She is alert and oriented to person, place, and time. No cranial nerve deficit.   Skin: Skin is warm and dry. No erythema.   Psychiatric: She has a normal mood and affect. Her behavior is normal.   Results for GURPREET SHEIKH (MRN [id number]) as of 2/12/2018 08:46   11/1/2017  11/3/2017  2/6/2018    Sodium  136    Potassium  4.1    Chloride  103    Co2 "  25    Anion Gap  12    Glucose  103 (H)    Bun  12    Creatinine  0.8    GFR If Non African American  >60    Calcium  8.9    AST(SGOT)  20    ALT(SGPT)  20    Alkaline Phosphatase  89    Cholesterol,Tot 189  194   Triglycerides 353 (H)  390 (H)   HDL 59.0  58.0   Non HDL Cholesterol 130  136   LDL 59  58     Abdominal aortic doppler: 17  Aneurysm dilatation of the proximal abdominal aorta at 2.7 x 2.7 cm. Recommend follow-up in one year    Carotid doppler: 17  No evidence of hemodynamically significant stenosis (zero to 40%).No evidence of hemodynamically significant stenosis (zero to 40%).    Stress echo: 17  Patient exercised on treadmill for 4 minutes reached 103% MPHR achieved 7 METs.  Negative stress echocardiogram for ischemia with adequate stress achieved by heart rate criteria    EK/3/17  Sinus bradycardia 55 bpm, normal axis T-wave inversion anterior leads  Assessment:     1. Screening for abdominal aortic aneurysm  2. HTN  3. Dyslipidemia  4. Carotid artery disease    Medical Decision Making:  Today's Assessment / Status / Plan:     1. Radiology report stated abdominal aortic diameter is less than 3 cm but still has presence of aneurysm?? Unfortunately I can't pull up the images to review the pictures.  Will repeat abdominal aortic Doppler in one to reassess the size again.  2. Blood pressure well controlled at the present visit.  Continue ramipril 10 mg daily.  3. Referral to nutrition.  Start Zetia 10 mg daily  Continue Lopid 600 mg BID.  LDL less than 70.  4. Carotid artery Doppler prior to next visit.    Abdominal aortic Doppler and carotid Doppler in 2018. Labs in 2-3 months.  Referral to nutrition  Follow-up in 9 months.    Thank you for allowing me to participate in taking care of patient.    Maura Del Cid MD.

## 2018-02-13 ENCOUNTER — TELEPHONE (OUTPATIENT)
Dept: CARDIOLOGY | Facility: MEDICAL CENTER | Age: 68
End: 2018-02-13

## 2018-02-13 DIAGNOSIS — E78.1 HYPERTRIGLYCERIDEMIA: ICD-10-CM

## 2018-02-13 NOTE — TELEPHONE ENCOUNTER
"Erica Tran - REFERRAL TO NUTRITION SERVICES << Less Detail',event)\" href=\"javascript:;\">More Detail >>      REFERRAL TO NUTRITION SERVICES   Erica Tran   Sent: Mon February 12, 2018 10:42 AM   To: Bridget Gambino R.N.                  Message     This referral needs to be to Health Improvement Programs instead of Nutrition. This referral will be cancelled.      ========================================================================    Referral to Health Improvement Program initiated   "

## 2018-02-14 NOTE — TELEPHONE ENCOUNTER
"Erica Tran - REFERRAL TO [facility] HEALTH IMPROVEMENT PROGRAMS << Less Detail',event)\" href=\"javascript:;\">More Detail >>      REFERRAL TO [facility] HEALTH IMPROVEMENT PROGRAMS    Erica Tran   Sent: Tue February 13, 2018  9:31 AM   To: Bridget Gambino R.N.                  Message     The referral to [facility] Health Improvement Programs will be sent to [facility] Health Improvement Programs. The contact number is [phone].     =========================================================================    MyChart message sent regarding above information   "

## 2018-04-20 ENCOUNTER — APPOINTMENT (OUTPATIENT)
Dept: ADMISSIONS | Facility: MEDICAL CENTER | Age: 68
End: 2018-04-20
Attending: OPHTHALMOLOGY
Payer: MEDICARE

## 2018-04-20 RX ORDER — ASPIRIN 81 MG/1
81 TABLET, CHEWABLE ORAL DAILY
COMMUNITY
End: 2021-08-27

## 2018-04-20 RX ORDER — ATORVASTATIN CALCIUM 20 MG/1
20 TABLET, FILM COATED ORAL NIGHTLY
COMMUNITY
End: 2018-05-15 | Stop reason: SDUPTHER

## 2018-04-24 ENCOUNTER — HOSPITAL ENCOUNTER (OUTPATIENT)
Facility: MEDICAL CENTER | Age: 68
End: 2018-04-24
Attending: OPHTHALMOLOGY | Admitting: OPHTHALMOLOGY
Payer: MEDICARE

## 2018-04-24 VITALS
RESPIRATION RATE: 19 BRPM | WEIGHT: 231.04 LBS | SYSTOLIC BLOOD PRESSURE: 130 MMHG | HEART RATE: 58 BPM | HEIGHT: 66 IN | DIASTOLIC BLOOD PRESSURE: 80 MMHG | TEMPERATURE: 98.6 F | OXYGEN SATURATION: 95 % | BODY MASS INDEX: 37.13 KG/M2

## 2018-04-24 LAB — EKG IMPRESSION: NORMAL

## 2018-04-24 PROCEDURE — 160009 HCHG ANES TIME/MIN: Performed by: OPHTHALMOLOGY

## 2018-04-24 PROCEDURE — 700111 HCHG RX REV CODE 636 W/ 250 OVERRIDE (IP)

## 2018-04-24 PROCEDURE — 160041 HCHG SURGERY MINUTES - EA ADDL 1 MIN LEVEL 4: Performed by: OPHTHALMOLOGY

## 2018-04-24 PROCEDURE — A9270 NON-COVERED ITEM OR SERVICE: HCPCS

## 2018-04-24 PROCEDURE — 160035 HCHG PACU - 1ST 60 MINS PHASE I: Performed by: OPHTHALMOLOGY

## 2018-04-24 PROCEDURE — 700102 HCHG RX REV CODE 250 W/ 637 OVERRIDE(OP)

## 2018-04-24 PROCEDURE — 700101 HCHG RX REV CODE 250

## 2018-04-24 PROCEDURE — 160048 HCHG OR STATISTICAL LEVEL 1-5: Performed by: OPHTHALMOLOGY

## 2018-04-24 PROCEDURE — 502240 HCHG MISC OR SUPPLY RC 0272: Performed by: OPHTHALMOLOGY

## 2018-04-24 PROCEDURE — 160029 HCHG SURGERY MINUTES - 1ST 30 MINS LEVEL 4: Performed by: OPHTHALMOLOGY

## 2018-04-24 PROCEDURE — 501836 HCHG SUTURE EYE: Performed by: OPHTHALMOLOGY

## 2018-04-24 PROCEDURE — 500558 HCHG EYE SHIELD W/GARTER (FOX): Performed by: OPHTHALMOLOGY

## 2018-04-24 PROCEDURE — 160036 HCHG PACU - EA ADDL 30 MINS PHASE I: Performed by: OPHTHALMOLOGY

## 2018-04-24 PROCEDURE — 93005 ELECTROCARDIOGRAM TRACING: CPT | Performed by: OPHTHALMOLOGY

## 2018-04-24 PROCEDURE — 501425 HCHG SPONGE, WECKCEL SPEAR: Performed by: OPHTHALMOLOGY

## 2018-04-24 PROCEDURE — 160002 HCHG RECOVERY MINUTES (STAT): Performed by: OPHTHALMOLOGY

## 2018-04-24 PROCEDURE — 93010 ELECTROCARDIOGRAM REPORT: CPT | Performed by: INTERNAL MEDICINE

## 2018-04-24 PROCEDURE — A6410 STERILE EYE PAD: HCPCS | Performed by: OPHTHALMOLOGY

## 2018-04-24 PROCEDURE — 502000 HCHG MISC OR IMPLANTS RC 0278: Performed by: OPHTHALMOLOGY

## 2018-04-24 PROCEDURE — 87102 FUNGUS ISOLATION CULTURE: CPT

## 2018-04-24 RX ORDER — OXYCODONE HCL 5 MG/5 ML
SOLUTION, ORAL ORAL
Status: COMPLETED
Start: 2018-04-24 | End: 2018-04-24

## 2018-04-24 RX ORDER — MANNITOL 20 G/100ML
INJECTION, SOLUTION INTRAVENOUS
Status: COMPLETED
Start: 2018-04-24 | End: 2018-04-24

## 2018-04-24 RX ORDER — DEXAMETHASONE SODIUM PHOSPHATE 4 MG/ML
INJECTION, SOLUTION INTRA-ARTICULAR; INTRALESIONAL; INTRAMUSCULAR; INTRAVENOUS; SOFT TISSUE
Status: DISCONTINUED | OUTPATIENT
Start: 2018-04-24 | End: 2018-04-24 | Stop reason: HOSPADM

## 2018-04-24 RX ORDER — SODIUM CHLORIDE, SODIUM LACTATE, POTASSIUM CHLORIDE, CALCIUM CHLORIDE 600; 310; 30; 20 MG/100ML; MG/100ML; MG/100ML; MG/100ML
INJECTION, SOLUTION INTRAVENOUS CONTINUOUS
Status: DISCONTINUED | OUTPATIENT
Start: 2018-04-24 | End: 2018-04-24 | Stop reason: HOSPADM

## 2018-04-24 RX ORDER — PHENYLEPHRINE HYDROCHLORIDE 25 MG/ML
SOLUTION/ DROPS OPHTHALMIC
Status: COMPLETED
Start: 2018-04-24 | End: 2018-04-24

## 2018-04-24 RX ORDER — PILOCARPINE HYDROCHLORIDE 20 MG/ML
SOLUTION/ DROPS OPHTHALMIC
Status: COMPLETED
Start: 2018-04-24 | End: 2018-04-24

## 2018-04-24 RX ORDER — MOXIFLOXACIN 5 MG/ML
SOLUTION/ DROPS OPHTHALMIC
Status: COMPLETED
Start: 2018-04-24 | End: 2018-04-24

## 2018-04-24 RX ORDER — TETRACAINE HYDROCHLORIDE 5 MG/ML
SOLUTION OPHTHALMIC
Status: DISCONTINUED | OUTPATIENT
Start: 2018-04-24 | End: 2018-04-24 | Stop reason: HOSPADM

## 2018-04-24 RX ADMIN — OXYCODONE HYDROCHLORIDE 5 MG: 5 SOLUTION ORAL at 15:15

## 2018-04-24 RX ADMIN — MOXIFLOXACIN HYDROCHLORIDE: 5 SOLUTION/ DROPS OPHTHALMIC at 11:50

## 2018-04-24 RX ADMIN — PILOCARPINE HYDROCHLORIDE: 20 SOLUTION/ DROPS OPHTHALMIC at 11:50

## 2018-04-24 RX ADMIN — MANNITOL 0.25 G: 20 INJECTION, SOLUTION INTRAVENOUS at 12:30

## 2018-04-24 RX ADMIN — OXYCODONE HYDROCHLORIDE 5 MG: 5 SOLUTION ORAL at 16:00

## 2018-04-24 RX ADMIN — FENTANYL CITRATE 50 MCG: 50 INJECTION, SOLUTION INTRAMUSCULAR; INTRAVENOUS at 15:24

## 2018-04-24 RX ADMIN — SODIUM CHLORIDE, SODIUM LACTATE, POTASSIUM CHLORIDE, CALCIUM CHLORIDE: 600; 310; 30; 20 INJECTION, SOLUTION INTRAVENOUS at 13:20

## 2018-04-24 RX ADMIN — FENTANYL CITRATE 50 MCG: 50 INJECTION, SOLUTION INTRAMUSCULAR; INTRAVENOUS at 15:15

## 2018-04-24 ASSESSMENT — PAIN SCALES - GENERAL
PAINLEVEL_OUTOF10: 9
PAINLEVEL_OUTOF10: 3
PAINLEVEL_OUTOF10: 9
PAINLEVEL_OUTOF10: 3
PAINLEVEL_OUTOF10: 5

## 2018-04-24 NOTE — OR NURSING
1310-Mannitol IV completed, pt helped to the restroom and back to [facility], LR 1 liter started.

## 2018-04-24 NOTE — DISCHARGE INSTRUCTIONS
ACTIVITY: Rest and take it easy for the first 24 hours.  A responsible adult is recommended to remain with you during that time.  It is normal to feel sleepy.  We encourage you to not do anything that requires balance, judgment or coordination.    MILD FLU-LIKE SYMPTOMS ARE NORMAL. YOU MAY EXPERIENCE GENERALIZED MUSCLE ACHES, THROAT IRRITATION, HEADACHE AND/OR SOME NAUSEA.    FOR 24 HOURS DO NOT:  Drive, operate machinery or run household appliances.  Drink beer or alcoholic beverages.   Make important decisions or sign legal documents.    SPECIAL INSTRUCTIONS: *NO BENDING, STRAINING, STOOPING OR LIFTING UNTIL APPROVED BY DR IGLESIAS. OK TO TAKE TYLENOL AS NECESSARY FOR DISCOMFORT.**    DIET: To avoid nausea, slowly advance diet as tolerated, avoiding spicy or greasy foods for the first day.  Add more substantial food to your diet according to your physician's instructions.  Babies can be fed formula or breast milk as soon as they are hungry.  INCREASE FLUIDS AND FIBER TO AVOID CONSTIPATION.    SURGICAL DRESSING/BATHING: **KEEP SHIELD IN PLACE UNTIL APPOINTMENT TOMORROW*    FOLLOW-UP APPOINTMENT:  A follow-up appointment has been arranged with your doctor  *TOMORROW AS SCHEDULED.**    You should CALL YOUR PHYSICIAN if you develop:  Fever greater than 101 degrees F.  Pain not relieved by medication, or persistent nausea or vomiting.  Excessive bleeding (blood soaking through dressing) or unexpected drainage from the wound.  Extreme redness or swelling around the incision site, drainage of pus or foul smelling drainage.  Inability to urinate or empty your bladder within 8 hours.  Problems with breathing or chest pain.    You should call 911 if you develop problems with breathing or chest pain.  If you are unable to contact your doctor or surgical center, you should go to the nearest emergency room or urgent care center.    Physician's telephone #: *[phone]**    If any questions arise, call your doctor.  If your doctor  is not available, please feel free to call the Surgical Center at {Surgical Dept Numbers:[id number]}.  The Center is open Monday through Friday from 7AM to 7PM.  You can also call the HEALTH HOTLINE open 24 hours/day, 7 days/week and speak to a nurse at [phone], or toll free at [phone].    A registered nurse may call you a few days after your surgery to see how you are doing after your procedure.    MEDICATIONS: Resume taking daily medication.  Take prescribed pain medication with food.  If no medication is prescribed, you may take non-aspirin pain medication if needed.  PAIN MEDICATION CAN BE VERY CONSTIPATING.  Take a stool softener or laxative such as senokot, pericolace, or milk of magnesia if needed.        Last pain medication given at *4:00 P.M.**.    If your physician has prescribed pain medication that includes Acetaminophen (Tylenol), do not take additional Acetaminophen (Tylenol) while taking the prescribed medication.    Depression / Suicide Risk    As you are discharged from this [facility] facility, it is important to learn how to keep safe from harming yourself.    Recognize the warning signs:  · Abrupt changes in personality, positive or negative- including increase in energy   · Giving away possessions  · Change in eating patterns- significant weight changes-  positive or negative  · Change in sleeping patterns- unable to sleep or sleeping all the time   · Unwillingness or inability to communicate  · Depression  · Unusual sadness, discouragement and loneliness  · Talk of wanting to die  · Neglect of personal appearance   · Rebelliousness- reckless behavior  · Withdrawal from people/activities they love  · Confusion- inability to concentrate     If you or a loved one observes any of these behaviors or has concerns about self-harm, here's what you can do:  · Talk about it- your feelings and reasons for harming yourself  · Remove any means that you might use to hurt yourself (examples:  pills, rope, extension cords, firearm)  · Get professional help from the community (Mental Health, Substance Abuse, psychological counseling)  · Do not be alone:Call your Safe Contact- someone whom you trust who will be there for you.  · Call your local CRISIS HOTLINE 379-7457 or 308-353-2037  · Call your local Children's Mobile Crisis Response Team [place] (805) 360-0990 or www.BandPage  · Call the toll free National Suicide Prevention Hotlines   · National Suicide Prevention Lifeline 176-105-VASR (9474)  · National Hope Line Network 800-SUICIDE (588-8211)

## 2018-04-24 NOTE — OP REPORT
DATE OF SERVICE:  04/24/2018    PROCEDURE PERFORMED:  Penetrating keratoplasty, right eye.    PREOPERATIVE DIAGNOSIS:  Corneal scar.    POSTOPERATIVE DIAGNOSIS:  Corneal scar.    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  For this reason, the   decision was made to perform corneal transplant in hopes of improving   corrected vision.    ANESTHESIA:  General anesthesia.    COMPLICATIONS:  None.    BLOOD LOSS:  Minimal.    SPECIMENS DONOR:  Corneal rim for fungal culture only.    DESCRIPTION OF PROCEDURE:  Risks, benefits, alternatives and indications for   surgery were reviewed with the patient preoperatively and all questions were   answered.  The patient wished to proceed with surgery and informed consent was   obtained.  On the day of surgery, the patient was brought to the operating   room where general anesthesia was administered and the right eye prepped and   draped.  A lid speculum was placed and the corneal center marked with a   sterile marking pen.  Attention was then brought to the donor corneal tissue,   which was from the [facility] with tissue #[id number].  The   donor details are as follows:  This is a 66-year-old female donor, primary   cause of death was hypoxic respiratory failure, date of death 04/20/2018,   death preservation time was 8 hours and 26 minutes with total ocular cooling   time of 4 minutes.  Preoperative cell count was 2778 with a clear zone was 7   mm.  All serologies as reported by the eye bank were negative.  This tissue   was fixated on a suction punch and trephinated to a diameter of 8.0 mm.    Attention was then brought back to the host cornea, which was trephinated to a   diameter of 7.75 mm using the suction trephine.   The chamber was entered   using a supersharp knife and Healon instilled into the anterior chamber.    Corneal scissors to the right and left were then used to remove the host   cornea, which was discarded and the donor cornea was then placed endothelial   side down on the bed of Healon and fixated to the host cornea with 16   interrupted 10-0 nylon sutures whose knots were buried into corneal stroma.    Periodically, the chamber was filled with sterile BSS and found to be holding   pressure and shape and at the end of the case, subconjunctival dexamethasone   was given in the eye given TobraDex ointment and firmly patched and shielded.    Patient was discharged to the postanesthesia care unit in stable condition   with plans for followup with Dr. Lennon next day in the eye clinic.       ____________________________________     MD VANESSA Mclaughlin / NTS    DD:  04/24/2018 15:00:54  DT:  04/24/2018 15:44:55    D#:  [id number]  Job#:  [id number]

## 2018-04-24 NOTE — OR NURSING
RECEIVED FROM OR WITH DR DEWEY.  PATIENT SUPINE AND SLEEPING.  VSS.  SHIELD ON RIGHT EYE DRY AND IN TACT.    1515 AWAKE.  C/O PAIN 9/10  MEDICATED PER ORDER.  1545 DAUGHTER AT BEDSIDE.  PATIENT REQUESTS MORE PAIN MED; EYE BURNING.  GIVEN PER ORDER.  1615 PAIN IMPROVING.  UP TO BATHROOM; VOID, DRESSED AND IN RECLINER.  DISCHARGE INSTRUCTIONS TO PATIENT AND DAUGHTER.  1640 PATIENT FEELS READY FOR DISCHARGE.  PAIN 3/10; DENIES NAUSEA.  ALL QUESTIONS ANSWERED.

## 2018-05-04 PROBLEM — M48.062 SPINAL STENOSIS OF LUMBAR REGION WITH NEUROGENIC CLAUDICATION: Status: ACTIVE | Noted: 2018-05-04

## 2018-05-24 LAB
FUNGUS SPEC CULT: NORMAL
SIGNIFICANT IND 70042: NORMAL
SITE SITE: NORMAL
SOURCE SOURCE: NORMAL

## 2018-09-11 ENCOUNTER — OFFICE VISIT (OUTPATIENT)
Dept: CARDIOLOGY | Facility: CLINIC | Age: 68
End: 2018-09-11
Payer: MEDICARE

## 2018-09-11 VITALS
HEIGHT: 66 IN | WEIGHT: 234 LBS | SYSTOLIC BLOOD PRESSURE: 120 MMHG | BODY MASS INDEX: 37.61 KG/M2 | DIASTOLIC BLOOD PRESSURE: 80 MMHG | OXYGEN SATURATION: 95 % | HEART RATE: 61 BPM

## 2018-09-11 DIAGNOSIS — R06.02 SHORTNESS OF BREATH: ICD-10-CM

## 2018-09-11 DIAGNOSIS — I10 ESSENTIAL HYPERTENSION: ICD-10-CM

## 2018-09-11 DIAGNOSIS — R07.9 CHEST PAIN, UNSPECIFIED TYPE: ICD-10-CM

## 2018-09-11 PROCEDURE — 99215 OFFICE O/P EST HI 40 MIN: CPT | Performed by: INTERNAL MEDICINE

## 2018-09-11 RX ORDER — PREDNISOLONE ACETATE 10 MG/ML
SUSPENSION/ DROPS OPHTHALMIC
COMMUNITY
Start: 2018-08-04 | End: 2020-11-13

## 2018-09-11 ASSESSMENT — ENCOUNTER SYMPTOMS
LOSS OF CONSCIOUSNESS: 0
ABDOMINAL PAIN: 0
ORTHOPNEA: 0
DIZZINESS: 0
DEPRESSION: 0
SHORTNESS OF BREATH: 1
PND: 0
FALLS: 0
PALPITATIONS: 0

## 2018-09-11 NOTE — PROGRESS NOTES
"Chief Complaint   Patient presents with   • HTN (Controlled)       Subjective:   Sylvia Diallo is a 68 y.o. female who presents today to follow-up on her hypertension.    Pertinent history:  Carotid stenosis status post left CEA in 2005  Hypertension  Hyperlipidemia  Obstructive sleep apnea on CPAP  History of GI bleed secondary to peptic ulcer    Patient reports that her blood pressures are usually well controlled, usually systolics 120s.  Her main concern today is substernal nonradiating chest tightness that she has noted in the past couple of months that occurs mostly with lying down but also with exertion.  Symptoms usually resolve within 5-10 minutes spontaneously with resting.  No associated dyspnea.  No prior history of similar symptoms.    She also reports having dyspnea mostly when she is exposed to allergens while doing yard work or vacuuming.  She can otherwise stay active without any dyspnea.    Past Medical History:   Diagnosis Date   • Anxiety    • Anxiety    • Arthritis    • Back pain    • Blind right eye    • Bowel habit changes     IBS   • Carotid disease, bilateral (HCC) January 2017    Carotid ultrasound with mild stenosis bilaterally.   • Chicken pox     history of   • Depression    • EBV exposure    • Gallbladder disease    • Headache(784.0)    • Heart burn     medicated, \"not so bad anymore\"   • Heart disease    • Hemorrhoids    • Herpes     history of   • High cholesterol    • Hypertension    • Measles     history of   • Migraine    • Mumps     history of   • Peptic ulcer    • Pleurisy    • Rheumatic fever     history of   • [place] fever     unspecified, history of   • Sleep apnea with use of continuous positive airway pressure (CPAP)    • Snoring     sleep study done   • Stomach ulcer    • Transfusion history    • Tuberculosis     never had tb, was exposed as a child, she reports that she has never been infected with TB   • Urinary incontinence    • Venereal disease     test +, " history of     Past Surgical History:   Procedure Laterality Date   • PENETRATING KERATOPLASTY Right 4/24/2018    Procedure: PENETRATING KERATOPLASTY;  Surgeon: Moises Lennon M.D.;  Location: SURGERY SAME DAY [facility];  Service: Ophthalmology   • EXTENSOR TENDON REPAIR Right 11/6/2017    Procedure: RT EXTENSOR TENDON REPAIR INDICIS PROPRIUS TO EXTENSOR POLLICIS LONGUS TRANSFER, POSSIBLE PALMARIS LONGUS HARVEST AND TRANSFER;  Surgeon: Tahir Garnett M.D.;  Location: SURGERY [facility];  Service: Orthopedics   • COLONOSCOPY  2016   • KNEE REPLACEMENT, TOTAL Bilateral 2014   • PELVIC RECONSTRUCTION LAPAROSCOPIC N/A 2006   • OTHER  1993    R eye lense   • CHOLECYSTECTOMY  1978   • ABDOMINAL HYSTERECTOMY TOTAL  1976   • CERVICAL FUSION POSTERIOR N/A 1965   • ABDOMINAL EXPLORATION      Gall bladder removal   • CAROTID ENDARTERECTOMY Left    • OTHER ORTHOPEDIC SURGERY Right     shoulder rotator cuff repair   • OTHER ORTHOPEDIC SURGERY Bilateral     total knee replacement     Family History   Problem Relation Age of Onset   • Cancer Mother    • Arthritis Mother    • Heart Disease Mother    • Lung Disease Father    • Arthritis Father    • Heart Disease Father      Social History     Social History   • Marital status:      Spouse name: N/A   • Number of children: N/A   • Years of education: N/A     Occupational History   • Not on file.     Social History Main Topics   • Smoking status: Former Smoker     Packs/day: 0.50     Years: 1.00     Types: Cigarettes     Quit date: 6/23/1972   • Smokeless tobacco: Never Used   • Alcohol use Yes      Comment: occasional, 1 x a year   • Drug use: No   • Sexual activity: Not on file     Other Topics Concern   • Not on file     Social History Narrative   • No narrative on file     Allergies   Allergen Reactions   • Darvon [Propoxyphene] Shortness of Breath and Swelling     Lip swelling, sob- reaction happens quickly   • Food Shortness of Breath and Swelling     Fish  and shellfish- blisters in mouth, throat, throat swelling, sob   • Iodine Anaphylaxis     allergic to shellfish- anaphylaxis   • Penicillins Shortness of Breath, Rash and Swelling     Rash, swelling, sob   • Shellfish Allergy Anaphylaxis     anaphylaxis   • Seasonal      Runny nose   • Codeine Rash and Itching     Rash, itching     Outpatient Encounter Prescriptions as of 9/11/2018   Medication Sig Dispense Refill   • prednisoLONE acetate (PRED FORTE) 1 % Suspension      • ramipril (ALTACE) 10 MG capsule TAKE 1 CAPSULE EVERY MORNING 90 Cap 3   • gemfibrozil (LOPID) 600 MG Tab Take 1 Tab by mouth 2 times a day. 180 Tab 3   • metoprolol (LOPRESSOR) 100 MG Tab Take 0.5 Tabs by mouth 2 times a day. 90 Tab 3   • citalopram (CELEXA) 10 MG tablet TAKE 1 TABLET EVERY DAY 90 Tab 3   • gabapentin (NEURONTIN) 800 MG tablet TAKE 1 TABLET THREE TIMES DAILY 270 Tab 3   • atorvastatin (LIPITOR) 20 MG Tab TAKE 1 TABLET EVERY DAY IN THE EVENING 90 Tab 3   • aspirin (ASA) 81 MG Chew Tab chewable tablet Take 81 mg by mouth every day.     • vitamin D (CHOLECALCIFEROL) 1000 UNIT Tab Take 1,000 Units by mouth every day.     • Multiple Vitamins-Calcium (ONE-A-DAY WOMENS PO) Take 1 Tab by mouth every day.     • POTASSIUM CHLORIDE PO Take 1 Tab by mouth every day.     • estradiol (ESTRACE) 1 MG Tab TAKE ONE & ONE-HALF TABLETS BY MOUTH ONCE DAILY 135 Tab 3   • pantoprazole (PROTONIX) 40 MG Tablet Delayed Response TAKE 1 TABLET EVERY DAY 90 Tab 3   • MELATONIN PO Take 1 Tab by mouth every bedtime.     • ezetimibe (ZETIA) 10 MG Tab Take 1 Tab by mouth every day. 30 Tab 11   • Cetirizine HCl (ZYRTEC ALLERGY PO) Take 1 Tab by mouth every day.     • NON SPECIFIED SLEEP APNEA MACHINE       No facility-administered encounter medications on file as of 9/11/2018.      Review of Systems   Constitutional: Negative for malaise/fatigue.   Respiratory: Positive for shortness of breath.    Cardiovascular: Positive for chest pain. Negative for palpitations,  "orthopnea, leg swelling and PND.   Gastrointestinal: Negative for abdominal pain.   Musculoskeletal: Negative for falls.   Neurological: Negative for dizziness and loss of consciousness.   Psychiatric/Behavioral: Negative for depression.   All other systems reviewed and are negative.       Objective:   /80   Pulse 61   Ht 1.664 m (5' 5.5\")   Wt 106.1 kg (234 lb)   LMP 01/01/1976 (Approximate)   SpO2 95%   BMI 38.35 kg/m²     Physical Exam   Constitutional: She is oriented to person, place, and time. She appears well-developed and well-nourished. No distress.   HENT:   Head: Normocephalic and atraumatic.   Eyes: Conjunctivae are normal.   Neck: Normal range of motion. Neck supple.   Cardiovascular: Normal rate, regular rhythm and normal heart sounds.  Exam reveals no gallop and no friction rub.    No murmur heard.  Pulmonary/Chest: Effort normal and breath sounds normal. No respiratory distress. She has no wheezes. She has no rales.   Abdominal: Soft. She exhibits no distension. There is no tenderness.   Musculoskeletal: She exhibits no edema.   Neurological: She is alert and oriented to person, place, and time.   Skin: Skin is warm and dry. She is not diaphoretic.   Psychiatric: She has a normal mood and affect. Her behavior is normal.   Nursing note and vitals reviewed.    Abdominal ultrasound performed in September 2018 showed no AAA.    KIMANI performed in February 2017 was personally reviewed and did not show any wall motion abnormalities.  Normal LV function.  However patient only exercised for 4 minutes on the Keo protocol.    Labs performed in April 2018 were reviewed and showed creatinine 0.8.  LDL 48.    Assessment:     1. Chest pain, unspecified type  NM-CARDIAC STRESS TEST   2. Shortness of breath     3. Essential hypertension         Medical Decision Making:  Today's Assessment / Status / Plan:     Chest pain: New issue.  Concerning for angina.  Patient will be referred for a pharmacologic " myocardial perfusion study for ischemic evaluation.  She did undergo an KIMANI in early 2017 however she only exercised for 4 minutes.  She will continue her aspirin.    Dyspnea: Likely secondary to allergens as noted above.  She has no dyspnea with other activities as discussed in HPI.  For now patient can try over-the-counter antiallergy medications.  May need to consider pulmonary referral or inhalers if symptoms persist.    Hypertension: Blood pressure is at goal.  Continue ramipril at current dose.  Renal function is normal.    Return to clinic in 6 months or earlier if needed.    Thank you for allowing me to participate in the care of this patient. Please do not hesitate to contact me with any questions.    Yudith Wade MD  Cardiologist  [facility]      PLEASE NOTE: This dictation was created using voice recognition software.

## 2018-09-11 NOTE — LETTER
"     [facility] [address],   [address]nd [place] [address]  Phone: [phone]  Fax: [phone]              Sylvia Diallo  1950    Encounter Date: 9/11/2018    Yudith Wade M.D.          PROGRESS NOTE:  Chief Complaint   Patient presents with   • HTN (Controlled)       Subjective:   Sylvia Diallo is a 68 y.o. female who presents today to follow-up on her hypertension.    Pertinent history:  Carotid stenosis status post left CEA in 2005  Hypertension  Hyperlipidemia  Obstructive sleep apnea on CPAP  History of GI bleed secondary to peptic ulcer    Patient reports that her blood pressures are usually well controlled, usually systolics 120s.  Her main concern today is substernal nonradiating chest tightness that she has noted in the past couple of months that occurs mostly with lying down but also with exertion.  Symptoms usually resolve within 5-10 minutes spontaneously with resting.  No associated dyspnea.  No prior history of similar symptoms.    She also reports having dyspnea mostly when she is exposed to allergens while doing yard work or vacuuming.  She can otherwise stay active without any dyspnea.    Past Medical History:   Diagnosis Date   • Anxiety    • Anxiety    • Arthritis    • Back pain    • Blind right eye    • Bowel habit changes     IBS   • Carotid disease, bilateral (HCC) January 2017    Carotid ultrasound with mild stenosis bilaterally.   • Chicken pox     history of   • Depression    • EBV exposure    • Gallbladder disease    • Headache(784.0)    • Heart burn     medicated, \"not so bad anymore\"   • Heart disease    • Hemorrhoids    • Herpes     history of   • High cholesterol    • Hypertension    • Measles     history of   • Migraine    • Mumps     history of   • Peptic ulcer    • Pleurisy    • Rheumatic fever     history of   • [place] fever     unspecified, history of   • Sleep apnea with use of " continuous positive airway pressure (CPAP)    • Snoring     sleep study done   • Stomach ulcer    • Transfusion history    • Tuberculosis     never had tb, was exposed as a child, she reports that she has never been infected with TB   • Urinary incontinence    • Venereal disease     test +, history of     Past Surgical History:   Procedure Laterality Date   • PENETRATING KERATOPLASTY Right 4/24/2018    Procedure: PENETRATING KERATOPLASTY;  Surgeon: Moises Lennon M.D.;  Location: SURGERY SAME DAY [facility];  Service: Ophthalmology   • EXTENSOR TENDON REPAIR Right 11/6/2017    Procedure: RT EXTENSOR TENDON REPAIR INDICIS PROPRIUS TO EXTENSOR POLLICIS LONGUS TRANSFER, POSSIBLE PALMARIS LONGUS HARVEST AND TRANSFER;  Surgeon: Tahir Garnett M.D.;  Location: SURGERY [facility];  Service: Orthopedics   • COLONOSCOPY  2016   • KNEE REPLACEMENT, TOTAL Bilateral 2014   • PELVIC RECONSTRUCTION LAPAROSCOPIC N/A 2006   • OTHER  1993    R eye lense   • CHOLECYSTECTOMY  1978   • ABDOMINAL HYSTERECTOMY TOTAL  1976   • CERVICAL FUSION POSTERIOR N/A 1965   • ABDOMINAL EXPLORATION      Gall bladder removal   • CAROTID ENDARTERECTOMY Left    • OTHER ORTHOPEDIC SURGERY Right     shoulder rotator cuff repair   • OTHER ORTHOPEDIC SURGERY Bilateral     total knee replacement     Family History   Problem Relation Age of Onset   • Cancer Mother    • Arthritis Mother    • Heart Disease Mother    • Lung Disease Father    • Arthritis Father    • Heart Disease Father      Social History     Social History   • Marital status:      Spouse name: N/A   • Number of children: N/A   • Years of education: N/A     Occupational History   • Not on file.     Social History Main Topics   • Smoking status: Former Smoker     Packs/day: 0.50     Years: 1.00     Types: Cigarettes     Quit date: 6/23/1972   • Smokeless tobacco: Never Used   • Alcohol use Yes      Comment: occasional, 1 x a year   • Drug use: No   • Sexual activity: Not on  file     Other Topics Concern   • Not on file     Social History Narrative   • No narrative on file     Allergies   Allergen Reactions   • Darvon [Propoxyphene] Shortness of Breath and Swelling     Lip swelling, sob- reaction happens quickly   • Food Shortness of Breath and Swelling     Fish and shellfish- blisters in mouth, throat, throat swelling, sob   • Iodine Anaphylaxis     allergic to shellfish- anaphylaxis   • Penicillins Shortness of Breath, Rash and Swelling     Rash, swelling, sob   • Shellfish Allergy Anaphylaxis     anaphylaxis   • Seasonal      Runny nose   • Codeine Rash and Itching     Rash, itching     Outpatient Encounter Prescriptions as of 9/11/2018   Medication Sig Dispense Refill   • prednisoLONE acetate (PRED FORTE) 1 % Suspension      • ramipril (ALTACE) 10 MG capsule TAKE 1 CAPSULE EVERY MORNING 90 Cap 3   • gemfibrozil (LOPID) 600 MG Tab Take 1 Tab by mouth 2 times a day. 180 Tab 3   • metoprolol (LOPRESSOR) 100 MG Tab Take 0.5 Tabs by mouth 2 times a day. 90 Tab 3   • citalopram (CELEXA) 10 MG tablet TAKE 1 TABLET EVERY DAY 90 Tab 3   • gabapentin (NEURONTIN) 800 MG tablet TAKE 1 TABLET THREE TIMES DAILY 270 Tab 3   • atorvastatin (LIPITOR) 20 MG Tab TAKE 1 TABLET EVERY DAY IN THE EVENING 90 Tab 3   • aspirin (ASA) 81 MG Chew Tab chewable tablet Take 81 mg by mouth every day.     • vitamin D (CHOLECALCIFEROL) 1000 UNIT Tab Take 1,000 Units by mouth every day.     • Multiple Vitamins-Calcium (ONE-A-DAY WOMENS PO) Take 1 Tab by mouth every day.     • POTASSIUM CHLORIDE PO Take 1 Tab by mouth every day.     • estradiol (ESTRACE) 1 MG Tab TAKE ONE & ONE-HALF TABLETS BY MOUTH ONCE DAILY 135 Tab 3   • pantoprazole (PROTONIX) 40 MG Tablet Delayed Response TAKE 1 TABLET EVERY DAY 90 Tab 3   • MELATONIN PO Take 1 Tab by mouth every bedtime.     • ezetimibe (ZETIA) 10 MG Tab Take 1 Tab by mouth every day. 30 Tab 11   • Cetirizine HCl (ZYRTEC ALLERGY PO) Take 1 Tab by mouth every day.     • NON  "SPECIFIED SLEEP APNEA MACHINE       No facility-administered encounter medications on file as of 9/11/2018.      Review of Systems   Constitutional: Negative for malaise/fatigue.   Respiratory: Positive for shortness of breath.    Cardiovascular: Positive for chest pain. Negative for palpitations, orthopnea, leg swelling and PND.   Gastrointestinal: Negative for abdominal pain.   Musculoskeletal: Negative for falls.   Neurological: Negative for dizziness and loss of consciousness.   Psychiatric/Behavioral: Negative for depression.   All other systems reviewed and are negative.       Objective:   /80   Pulse 61   Ht 1.664 m (5' 5.5\")   Wt 106.1 kg (234 lb)   LMP 01/01/1976 (Approximate)   SpO2 95%   BMI 38.35 kg/m²      Physical Exam   Constitutional: She is oriented to person, place, and time. She appears well-developed and well-nourished. No distress.   HENT:   Head: Normocephalic and atraumatic.   Eyes: Conjunctivae are normal.   Neck: Normal range of motion. Neck supple.   Cardiovascular: Normal rate, regular rhythm and normal heart sounds.  Exam reveals no gallop and no friction rub.    No murmur heard.  Pulmonary/Chest: Effort normal and breath sounds normal. No respiratory distress. She has no wheezes. She has no rales.   Abdominal: Soft. She exhibits no distension. There is no tenderness.   Musculoskeletal: She exhibits no edema.   Neurological: She is alert and oriented to person, place, and time.   Skin: Skin is warm and dry. She is not diaphoretic.   Psychiatric: She has a normal mood and affect. Her behavior is normal.   Nursing note and vitals reviewed.    Abdominal ultrasound performed in September 2018 showed no AAA.    KIMANI performed in February 2017 was personally reviewed and did not show any wall motion abnormalities.  Normal LV function.  However patient only exercised for 4 minutes on the Keo protocol.    Labs performed in April 2018 were reviewed and showed creatinine 0.8.  LDL " 48.    Assessment:     1. Chest pain, unspecified type  NM-CARDIAC STRESS TEST   2. Shortness of breath     3. Essential hypertension         Medical Decision Making:  Today's Assessment / Status / Plan:     Chest pain: New issue.  Concerning for angina.  Patient will be referred for a pharmacologic myocardial perfusion study for ischemic evaluation.  She did undergo an KIMANI in early 2017 however she only exercised for 4 minutes.  She will continue her aspirin.    Dyspnea: Likely secondary to allergens as noted above.  She has no dyspnea with other activities as discussed in HPI.  For now patient can try over-the-counter antiallergy medications.  May need to consider pulmonary referral or inhalers if symptoms persist.    Hypertension: Blood pressure is at goal.  Continue ramipril at current dose.  Renal function is normal.    Return to clinic in 6 months or earlier if needed.    Thank you for allowing me to participate in the care of this patient. Please do not hesitate to contact me with any questions.    Yudith Wade MD  Cardiologist  [facility]      PLEASE NOTE: This dictation was created using voice recognition software.         Arun Cheema M.D.  [address] [facility] [address]  VIA In Basket

## 2018-10-02 DIAGNOSIS — E78.5 HYPERLIPIDEMIA, UNSPECIFIED HYPERLIPIDEMIA TYPE: Primary | ICD-10-CM

## 2018-10-02 RX ORDER — EZETIMIBE 10 MG/1
10 TABLET ORAL DAILY
Qty: 90 TAB | Refills: 3 | Status: SHIPPED | OUTPATIENT
Start: 2018-10-02 | End: 2018-11-09

## 2018-12-20 PROBLEM — G89.29 CHRONIC MIDLINE LOW BACK PAIN WITHOUT SCIATICA: Status: ACTIVE | Noted: 2018-12-20

## 2018-12-20 PROBLEM — M54.50 CHRONIC MIDLINE LOW BACK PAIN WITHOUT SCIATICA: Status: ACTIVE | Noted: 2018-12-20

## 2019-04-09 ENCOUNTER — NON-PROVIDER VISIT (OUTPATIENT)
Dept: CARDIOLOGY | Facility: CLINIC | Age: 69
End: 2019-04-09
Payer: MEDICARE

## 2019-04-09 ENCOUNTER — OFFICE VISIT (OUTPATIENT)
Dept: CARDIOLOGY | Facility: CLINIC | Age: 69
End: 2019-04-09
Payer: MEDICARE

## 2019-04-09 VITALS
OXYGEN SATURATION: 89 % | BODY MASS INDEX: 36.32 KG/M2 | WEIGHT: 226 LBS | HEART RATE: 66 BPM | HEIGHT: 66 IN | DIASTOLIC BLOOD PRESSURE: 70 MMHG | SYSTOLIC BLOOD PRESSURE: 128 MMHG

## 2019-04-09 DIAGNOSIS — I10 HYPERTENSION, ESSENTIAL: ICD-10-CM

## 2019-04-09 DIAGNOSIS — R00.2 PALPITATIONS: ICD-10-CM

## 2019-04-09 DIAGNOSIS — E78.49 OTHER HYPERLIPIDEMIA: ICD-10-CM

## 2019-04-09 DIAGNOSIS — I49.3 PVC (PREMATURE VENTRICULAR CONTRACTION): ICD-10-CM

## 2019-04-09 DIAGNOSIS — R06.02 SOB (SHORTNESS OF BREATH): ICD-10-CM

## 2019-04-09 LAB — EKG IMPRESSION: NORMAL

## 2019-04-09 PROCEDURE — 93224 XTRNL ECG REC UP TO 48 HRS: CPT | Performed by: INTERNAL MEDICINE

## 2019-04-09 PROCEDURE — 93000 ELECTROCARDIOGRAM COMPLETE: CPT | Performed by: INTERNAL MEDICINE

## 2019-04-09 PROCEDURE — 99215 OFFICE O/P EST HI 40 MIN: CPT | Performed by: INTERNAL MEDICINE

## 2019-04-09 RX ORDER — DORZOLAMIDE HYDROCHLORIDE AND TIMOLOL MALEATE 20; 5 MG/ML; MG/ML
SOLUTION/ DROPS OPHTHALMIC
COMMUNITY
Start: 2019-02-06 | End: 2021-08-27

## 2019-04-09 ASSESSMENT — ENCOUNTER SYMPTOMS
ORTHOPNEA: 0
ABDOMINAL PAIN: 0
LOSS OF CONSCIOUSNESS: 0
PND: 0
DEPRESSION: 0
FALLS: 0
SHORTNESS OF BREATH: 1
PALPITATIONS: 0
DIZZINESS: 0

## 2019-04-09 NOTE — LETTER
"     [facility] [address],   [address] [place] [address]  Phone: [phone]  Fax: [phone]              Sylvia Diallo  1950    Encounter Date: 4/9/2019    Yudith Wade M.D.          PROGRESS NOTE:  Chief Complaint   Patient presents with   • HTN (Controlled)       Subjective:   Sylvia Diallo is a 69-year-old female presenting to clinic for follow-up on her hypertension.    Pertinent history:  Carotid stenosis status post left CEA in 2005  Hypertension  Hyperlipidemia  Obstructive sleep apnea on CPAP  History of GI bleed secondary to peptic ulcer    Patient's main concern today is dyspnea with exertion that has steadily worsened in the past 6 months.  She denies any symptoms at rest.  She reports having associated palpitations but no chest discomfort.  She can only walk short distances before she has to stop and rest.  Denies any associated heart failure symptoms.    She does report having frequent skipped beats.    She was on statins previously which she was unable to tolerate.  She is now on Zetia and fenofibrate which she is tolerating well.    Her blood pressures have been well controlled, like today.    She has not had any recurrent chest discomfort since her last visit.    Past Medical History:   Diagnosis Date   • Anxiety    • Anxiety    • Arthritis    • Back pain    • Blind right eye    • Bowel habit changes     IBS   • Carotid disease, bilateral (HCC) January 2017    Carotid ultrasound with mild stenosis bilaterally.   • Chicken pox     history of   • Depression    • EBV exposure    • Gallbladder disease    • Headache(784.0)    • Heart burn     medicated, \"not so bad anymore\"   • Heart disease    • Hemorrhoids    • Herpes     history of   • High cholesterol    • Hypertension    • Measles     history of   • Migraine    • Mumps     history of   • Peptic ulcer    • Pleurisy    • Rheumatic fever     history of   • " [place] fever     unspecified, history of   • Sleep apnea with use of continuous positive airway pressure (CPAP)    • Snoring     sleep study done   • Stomach ulcer    • Transfusion history    • Tuberculosis     never had tb, was exposed as a child, she reports that she has never been infected with TB   • Urinary incontinence    • Venereal disease     test +, history of     Past Surgical History:   Procedure Laterality Date   • PENETRATING KERATOPLASTY Right 4/24/2018    Procedure: PENETRATING KERATOPLASTY;  Surgeon: Moises Lennon M.D.;  Location: SURGERY SAME DAY [facility];  Service: Ophthalmology   • EXTENSOR TENDON REPAIR Right 11/6/2017    Procedure: RT EXTENSOR TENDON REPAIR INDICIS PROPRIUS TO EXTENSOR POLLICIS LONGUS TRANSFER, POSSIBLE PALMARIS LONGUS HARVEST AND TRANSFER;  Surgeon: Tahir Garnett M.D.;  Location: SURGERY [facility];  Service: Orthopedics   • COLONOSCOPY  2016   • KNEE REPLACEMENT, TOTAL Bilateral 2014   • PELVIC RECONSTRUCTION LAPAROSCOPIC N/A 2006   • OTHER  1993    R eye lense   • CHOLECYSTECTOMY  1978   • ABDOMINAL HYSTERECTOMY TOTAL  1976   • CERVICAL FUSION POSTERIOR N/A 1965   • ABDOMINAL EXPLORATION      Gall bladder removal   • CAROTID ENDARTERECTOMY Left    • OTHER ORTHOPEDIC SURGERY Right     shoulder rotator cuff repair   • OTHER ORTHOPEDIC SURGERY Bilateral     total knee replacement     Family History   Problem Relation Age of Onset   • Cancer Mother    • Arthritis Mother    • Heart Disease Mother    • Lung Disease Father    • Arthritis Father    • Heart Disease Father      Social History     Social History   • Marital status:      Spouse name: N/A   • Number of children: N/A   • Years of education: N/A     Occupational History   • Not on file.     Social History Main Topics   • Smoking status: Former Smoker     Packs/day: 0.50     Years: 1.00     Types: Cigarettes     Quit date: 6/23/1972   • Smokeless tobacco: Never Used   • Alcohol use Yes       Comment: occasional, 1 x a year   • Drug use: No   • Sexual activity: Not on file     Other Topics Concern   • Not on file     Social History Narrative   • No narrative on file     Allergies   Allergen Reactions   • Darvon [Propoxyphene] Shortness of Breath and Swelling     Lip swelling, sob- reaction happens quickly   • Food Shortness of Breath and Swelling     Fish and shellfish- blisters in mouth, throat, throat swelling, sob   • Iodine Anaphylaxis     allergic to shellfish- anaphylaxis   • Penicillins Shortness of Breath, Rash and Swelling     Rash, swelling, sob   • Shellfish Allergy Anaphylaxis     anaphylaxis   • Seasonal      Runny nose   • Codeine Rash and Itching     Rash, itching     Outpatient Encounter Prescriptions as of 4/9/2019   Medication Sig Dispense Refill   • dorzolamide-timolol (COSOPT) 22.3-6.8 MG/ML Solution      • ezetimibe (ZETIA) 10 MG Tab Take 1 Tab by mouth every day. 30 Tab 3   • Cyanocobalamin (B-12 PO) Take  by mouth.     • metoprolol (LOPRESSOR) 100 MG Tab TAKE 1 TABLET AT BEDTIME 90 Tab 3   • pantoprazole (PROTONIX) 40 MG Tablet Delayed Response TAKE 1 TABLET EVERY DAY 90 Tab 3   • fenofibrate (TRIGLIDE) 160 MG tablet TAKE 1 TABLET BY MOUTH ONCE DAILY 90 Tab 3   • estradiol (ESTRACE) 1 MG Tab TAKE 1 & 1/2 (ONE & ONE-HALF) TABLETS BY MOUTH ONCE DAILY 135 Tab 3   • prednisoLONE acetate (PRED FORTE) 1 % Suspension      • ramipril (ALTACE) 10 MG capsule TAKE 1 CAPSULE EVERY MORNING 90 Cap 3   • citalopram (CELEXA) 10 MG tablet TAKE 1 TABLET EVERY DAY 90 Tab 3   • gabapentin (NEURONTIN) 800 MG tablet TAKE 1 TABLET THREE TIMES DAILY 270 Tab 3   • aspirin (ASA) 81 MG Chew Tab chewable tablet Take 81 mg by mouth every day.     • vitamin D (CHOLECALCIFEROL) 1000 UNIT Tab Take 1,000 Units by mouth every day.     • Multiple Vitamins-Calcium (ONE-A-DAY WOMENS PO) Take 1 Tab by mouth every day.     • POTASSIUM CHLORIDE PO Take 1 Tab by mouth every day.     • MELATONIN PO Take 1 Tab by mouth  "every bedtime.     • Cetirizine HCl (ZYRTEC ALLERGY PO) Take 1 Tab by mouth every day.     • NON SPECIFIED SLEEP APNEA MACHINE       No facility-administered encounter medications on file as of 4/9/2019.      Review of Systems   Constitutional: Negative for malaise/fatigue.   Respiratory: Positive for shortness of breath.    Cardiovascular: Negative for chest pain, palpitations, orthopnea, leg swelling and PND.   Gastrointestinal: Negative for abdominal pain.   Musculoskeletal: Negative for falls.   Neurological: Negative for dizziness and loss of consciousness.   Psychiatric/Behavioral: Negative for depression.   All other systems reviewed and are negative.       Objective:   /70 (BP Location: Right arm, Patient Position: Sitting)   Pulse 66   Ht 1.664 m (5' 5.5\")   Wt 102.5 kg (226 lb)   LMP 01/01/1976 (Approximate)   SpO2 89%   BMI 37.04 kg/m²      Physical Exam   Constitutional: She is oriented to person, place, and time. She appears well-developed and well-nourished. No distress.   HENT:   Head: Normocephalic and atraumatic.   Eyes: Conjunctivae are normal. No scleral icterus.   Neck: Normal range of motion. Neck supple.   Cardiovascular: Normal rate, regular rhythm and normal heart sounds.  Exam reveals no gallop and no friction rub.    No murmur heard.  Pulmonary/Chest: Effort normal and breath sounds normal. No respiratory distress. She has no wheezes. She has no rales.   Abdominal: Soft. She exhibits no distension. There is no tenderness.   Musculoskeletal: She exhibits no edema.   Neurological: She is alert and oriented to person, place, and time.   Skin: Skin is warm and dry. She is not diaphoretic.   Psychiatric: She has a normal mood and affect. Her behavior is normal.   Nursing note and vitals reviewed.    Abdominal ultrasound performed in September 2018 showed no AAA.    KIMANI performed in February 2017 did not show any wall motion abnormalities.  Normal LV function.  However patient only " exercised for 4 minutes on the Keo protocol.      Myocardial perfusion study performed in September 2018 was reviewed and did not show any fixed or reversible defects.    Lipid panel performed in December 2018 were reviewed and showed LDL of 110.  Triglycerides 199.  HDL 59.    EKG performed today was personally reviewed and per my interpretation shows sinus rhythm at 61 bpm.  First-degree AV block.  Nonspecific T wave changes.    Assessment:     1. SOB (shortness of breath)  EKG    EC-ECHOCARDIOGRAM COMPLETE W/O CONT   2. Palpitations  Holter Monitor Study   3. Hypertension, essential     4. Other hyperlipidemia         Medical Decision Making:  Today's Assessment / Status / Plan:     Dyspnea on exertion:  Palpitations:  Chest discomfort:    Her myocardial perfusion study was low risk.  She has not had any recurrent chest discomfort.  I will refer her for an echocardiogram to evaluate her LV function.  She will also be referred for a 24-hour Holter monitor to evaluate her palpitations and her skipped beats.  She has a significant history of secondhand smoking.  I do not think her symptoms are cardiac in nature.  Would consider pulmonary function testing but I will defer this decision to patient's primary care physician, Dr. Cheema.     Hypertension: Blood pressure is at goal.  Continue current medication regimen including ramipril.    Hyperlipidemia: Continue Zetia and fenofibrate.  LDL is at goal.    Return to clinic in 4 months or earlier if needed.    Thank you for allowing me to participate in the care of this patient. Please do not hesitate to contact me with any questions.    Yudith Wade MD  Cardiologist  [facility]      PLEASE NOTE: This dictation was created using voice recognition software.         Arun Cheema M.D.  [address] [place] [id number]  VIA In Basket

## 2019-04-09 NOTE — PROGRESS NOTES
"Chief Complaint   Patient presents with   • HTN (Controlled)       Subjective:   Sylvia Diallo is a 69-year-old female presenting to clinic for follow-up on her hypertension.    Pertinent history:  Carotid stenosis status post left CEA in 2005  Hypertension  Hyperlipidemia  Obstructive sleep apnea on CPAP  History of GI bleed secondary to peptic ulcer    Patient's main concern today is dyspnea with exertion that has steadily worsened in the past 6 months.  She denies any symptoms at rest.  She reports having associated palpitations but no chest discomfort.  She can only walk short distances before she has to stop and rest.  Denies any associated heart failure symptoms.    She does report having frequent skipped beats.    She was on statins previously which she was unable to tolerate.  She is now on Zetia and fenofibrate which she is tolerating well.    Her blood pressures have been well controlled, like today.    She has not had any recurrent chest discomfort since her last visit.    Past Medical History:   Diagnosis Date   • Anxiety    • Anxiety    • Arthritis    • Back pain    • Blind right eye    • Bowel habit changes     IBS   • Carotid disease, bilateral (HCC) January 2017    Carotid ultrasound with mild stenosis bilaterally.   • Chicken pox     history of   • Depression    • EBV exposure    • Gallbladder disease    • Headache(784.0)    • Heart burn     medicated, \"not so bad anymore\"   • Heart disease    • Hemorrhoids    • Herpes     history of   • High cholesterol    • Hypertension    • Measles     history of   • Migraine    • Mumps     history of   • Peptic ulcer    • Pleurisy    • Rheumatic fever     history of   • [place] fever     unspecified, history of   • Sleep apnea with use of continuous positive airway pressure (CPAP)    • Snoring     sleep study done   • Stomach ulcer    • Transfusion history    • Tuberculosis     never had tb, was exposed as a child, she reports that she has never been " infected with TB   • Urinary incontinence    • Venereal disease     test +, history of     Past Surgical History:   Procedure Laterality Date   • PENETRATING KERATOPLASTY Right 4/24/2018    Procedure: PENETRATING KERATOPLASTY;  Surgeon: Moises Lennon M.D.;  Location: SURGERY SAME DAY [facility];  Service: Ophthalmology   • EXTENSOR TENDON REPAIR Right 11/6/2017    Procedure: RT EXTENSOR TENDON REPAIR INDICIS PROPRIUS TO EXTENSOR POLLICIS LONGUS TRANSFER, POSSIBLE PALMARIS LONGUS HARVEST AND TRANSFER;  Surgeon: Tahir Garnett M.D.;  Location: SURGERY [facility];  Service: Orthopedics   • COLONOSCOPY  2016   • KNEE REPLACEMENT, TOTAL Bilateral 2014   • PELVIC RECONSTRUCTION LAPAROSCOPIC N/A 2006   • OTHER  1993    R eye lense   • CHOLECYSTECTOMY  1978   • ABDOMINAL HYSTERECTOMY TOTAL  1976   • CERVICAL FUSION POSTERIOR N/A 1965   • ABDOMINAL EXPLORATION      Gall bladder removal   • CAROTID ENDARTERECTOMY Left    • OTHER ORTHOPEDIC SURGERY Right     shoulder rotator cuff repair   • OTHER ORTHOPEDIC SURGERY Bilateral     total knee replacement     Family History   Problem Relation Age of Onset   • Cancer Mother    • Arthritis Mother    • Heart Disease Mother    • Lung Disease Father    • Arthritis Father    • Heart Disease Father      Social History     Social History   • Marital status:      Spouse name: N/A   • Number of children: N/A   • Years of education: N/A     Occupational History   • Not on file.     Social History Main Topics   • Smoking status: Former Smoker     Packs/day: 0.50     Years: 1.00     Types: Cigarettes     Quit date: 6/23/1972   • Smokeless tobacco: Never Used   • Alcohol use Yes      Comment: occasional, 1 x a year   • Drug use: No   • Sexual activity: Not on file     Other Topics Concern   • Not on file     Social History Narrative   • No narrative on file     Allergies   Allergen Reactions   • Darvon [Propoxyphene] Shortness of Breath and Swelling     Lip swelling, sob-  reaction happens quickly   • Food Shortness of Breath and Swelling     Fish and shellfish- blisters in mouth, throat, throat swelling, sob   • Iodine Anaphylaxis     allergic to shellfish- anaphylaxis   • Penicillins Shortness of Breath, Rash and Swelling     Rash, swelling, sob   • Shellfish Allergy Anaphylaxis     anaphylaxis   • Seasonal      Runny nose   • Codeine Rash and Itching     Rash, itching     Outpatient Encounter Prescriptions as of 4/9/2019   Medication Sig Dispense Refill   • dorzolamide-timolol (COSOPT) 22.3-6.8 MG/ML Solution      • ezetimibe (ZETIA) 10 MG Tab Take 1 Tab by mouth every day. 30 Tab 3   • Cyanocobalamin (B-12 PO) Take  by mouth.     • metoprolol (LOPRESSOR) 100 MG Tab TAKE 1 TABLET AT BEDTIME 90 Tab 3   • pantoprazole (PROTONIX) 40 MG Tablet Delayed Response TAKE 1 TABLET EVERY DAY 90 Tab 3   • fenofibrate (TRIGLIDE) 160 MG tablet TAKE 1 TABLET BY MOUTH ONCE DAILY 90 Tab 3   • estradiol (ESTRACE) 1 MG Tab TAKE 1 & 1/2 (ONE & ONE-HALF) TABLETS BY MOUTH ONCE DAILY 135 Tab 3   • prednisoLONE acetate (PRED FORTE) 1 % Suspension      • ramipril (ALTACE) 10 MG capsule TAKE 1 CAPSULE EVERY MORNING 90 Cap 3   • citalopram (CELEXA) 10 MG tablet TAKE 1 TABLET EVERY DAY 90 Tab 3   • gabapentin (NEURONTIN) 800 MG tablet TAKE 1 TABLET THREE TIMES DAILY 270 Tab 3   • aspirin (ASA) 81 MG Chew Tab chewable tablet Take 81 mg by mouth every day.     • vitamin D (CHOLECALCIFEROL) 1000 UNIT Tab Take 1,000 Units by mouth every day.     • Multiple Vitamins-Calcium (ONE-A-DAY WOMENS PO) Take 1 Tab by mouth every day.     • POTASSIUM CHLORIDE PO Take 1 Tab by mouth every day.     • MELATONIN PO Take 1 Tab by mouth every bedtime.     • Cetirizine HCl (ZYRTEC ALLERGY PO) Take 1 Tab by mouth every day.     • NON SPECIFIED SLEEP APNEA MACHINE       No facility-administered encounter medications on file as of 4/9/2019.      Review of Systems   Constitutional: Negative for malaise/fatigue.   Respiratory: Positive  "for shortness of breath.    Cardiovascular: Negative for chest pain, palpitations, orthopnea, leg swelling and PND.   Gastrointestinal: Negative for abdominal pain.   Musculoskeletal: Negative for falls.   Neurological: Negative for dizziness and loss of consciousness.   Psychiatric/Behavioral: Negative for depression.   All other systems reviewed and are negative.       Objective:   /70 (BP Location: Right arm, Patient Position: Sitting)   Pulse 66   Ht 1.664 m (5' 5.5\")   Wt 102.5 kg (226 lb)   LMP 01/01/1976 (Approximate)   SpO2 89%   BMI 37.04 kg/m²     Physical Exam   Constitutional: She is oriented to person, place, and time. She appears well-developed and well-nourished. No distress.   HENT:   Head: Normocephalic and atraumatic.   Eyes: Conjunctivae are normal. No scleral icterus.   Neck: Normal range of motion. Neck supple.   Cardiovascular: Normal rate, regular rhythm and normal heart sounds.  Exam reveals no gallop and no friction rub.    No murmur heard.  Pulmonary/Chest: Effort normal and breath sounds normal. No respiratory distress. She has no wheezes. She has no rales.   Abdominal: Soft. She exhibits no distension. There is no tenderness.   Musculoskeletal: She exhibits no edema.   Neurological: She is alert and oriented to person, place, and time.   Skin: Skin is warm and dry. She is not diaphoretic.   Psychiatric: She has a normal mood and affect. Her behavior is normal.   Nursing note and vitals reviewed.    Abdominal ultrasound performed in September 2018 showed no AAA.    KIMANI performed in February 2017 did not show any wall motion abnormalities.  Normal LV function.  However patient only exercised for 4 minutes on the Keo protocol.      Myocardial perfusion study performed in September 2018 was reviewed and did not show any fixed or reversible defects.    Lipid panel performed in December 2018 were reviewed and showed LDL of 110.  Triglycerides 199.  HDL 59.    EKG performed today " was personally reviewed and per my interpretation shows sinus rhythm at 61 bpm.  First-degree AV block.  Nonspecific T wave changes.    Assessment:     1. SOB (shortness of breath)  EKG    EC-ECHOCARDIOGRAM COMPLETE W/O CONT   2. Palpitations  Holter Monitor Study   3. Hypertension, essential     4. Other hyperlipidemia         Medical Decision Making:  Today's Assessment / Status / Plan:     Dyspnea on exertion:  Palpitations:  Chest discomfort:    Her myocardial perfusion study was low risk.  She has not had any recurrent chest discomfort.  I will refer her for an echocardiogram to evaluate her LV function.  She will also be referred for a 24-hour Holter monitor to evaluate her palpitations and her skipped beats.  She has a significant history of secondhand smoking.  I do not think her symptoms are cardiac in nature.  Would consider pulmonary function testing but I will defer this decision to patient's primary care physician, Dr. Cheema.     Hypertension: Blood pressure is at goal.  Continue current medication regimen including ramipril.    Hyperlipidemia: Continue Zetia and fenofibrate.  LDL is at goal.    Return to clinic in 4 months or earlier if needed.    Thank you for allowing me to participate in the care of this patient. Please do not hesitate to contact me with any questions.    Yudith Wade MD  Cardiologist  [facility]      PLEASE NOTE: This dictation was created using voice recognition software.

## 2019-04-16 LAB — EKG IMPRESSION: NORMAL

## 2019-05-07 PROBLEM — G47.33 OSA (OBSTRUCTIVE SLEEP APNEA): Status: ACTIVE | Noted: 2019-05-07

## 2019-05-07 PROBLEM — I35.1 MILD AI (AORTIC INSUFFICIENCY): Status: ACTIVE | Noted: 2019-05-07

## 2019-05-07 PROBLEM — I34.0 TRACE MITRAL REGURGITATION BY PRIOR ECHOCARDIOGRAM: Status: ACTIVE | Noted: 2019-05-07

## 2019-07-02 ENCOUNTER — OFFICE VISIT (OUTPATIENT)
Dept: CARDIOLOGY | Facility: CLINIC | Age: 69
End: 2019-07-02
Payer: MEDICARE

## 2019-07-02 VITALS
WEIGHT: 230 LBS | OXYGEN SATURATION: 91 % | BODY MASS INDEX: 36.96 KG/M2 | HEART RATE: 70 BPM | HEIGHT: 66 IN | SYSTOLIC BLOOD PRESSURE: 120 MMHG | DIASTOLIC BLOOD PRESSURE: 70 MMHG

## 2019-07-02 DIAGNOSIS — I10 HYPERTENSION, ESSENTIAL: ICD-10-CM

## 2019-07-02 DIAGNOSIS — E78.2 MIXED HYPERLIPIDEMIA: ICD-10-CM

## 2019-07-02 DIAGNOSIS — R06.02 SHORTNESS OF BREATH: ICD-10-CM

## 2019-07-02 DIAGNOSIS — Z79.899 ENCOUNTER FOR LONG-TERM (CURRENT) USE OF HIGH-RISK MEDICATION: ICD-10-CM

## 2019-07-02 PROCEDURE — 99215 OFFICE O/P EST HI 40 MIN: CPT | Performed by: INTERNAL MEDICINE

## 2019-07-02 RX ORDER — INHALER, ASSIST DEVICES
SPACER (EA) MISCELLANEOUS
Refills: 0 | COMMUNITY
Start: 2019-05-08

## 2019-07-02 ASSESSMENT — ENCOUNTER SYMPTOMS
LOSS OF CONSCIOUSNESS: 0
ABDOMINAL PAIN: 0
PALPITATIONS: 0
FALLS: 0
DIZZINESS: 0
DEPRESSION: 0
PND: 0
ORTHOPNEA: 0
SHORTNESS OF BREATH: 0
BACK PAIN: 1

## 2019-07-02 NOTE — LETTER
"     [facility] [address],   [address]nd [place] [address]  Phone: [phone]  Fax: [phone]              Sylvia Diallo  1950    Encounter Date: 7/2/2019    Yudith Wade M.D.          PROGRESS NOTE:  Chief Complaint   Patient presents with   • Shortness of Breath   • HTN (Controlled)       Subjective:   Sylvia Diallo is a 69-year-old female presenting to clinic for follow-up on dyspnea and hypertension.    Pertinent history:  Carotid stenosis status post left CEA in 2005  Hypertension  Hyperlipidemia, intolerant to statins  Obstructive sleep apnea on CPAP  History of GI bleed secondary to peptic ulcer    Since her last visit, she was diagnosed with asthma and is now on inhalers which has significantly improved her symptoms.  She has been able to work in her garden without any restrictions.    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  She does not have enough time to exercise but does try to stay active as much as possible.    Her blood pressures have been well controlled, like today.    For her hyperlipidemia she is currently on Zetia and fenofibrate which she is tolerating well.    Her activity is limited by back pain.    Past Medical History:   Diagnosis Date   • Anxiety    • Anxiety    • Arthritis    • Back pain    • Blind right eye    • Bowel habit changes     IBS   • Carotid disease, bilateral (HCC) January 2017    Carotid ultrasound with mild stenosis bilaterally.   • Chicken pox     history of   • Depression    • EBV exposure    • Gallbladder disease    • Headache(784.0)    • Heart burn     medicated, \"not so bad anymore\"   • Heart disease    • Hemorrhoids    • Herpes     history of   • High cholesterol    • Hypertension    • Measles     history of   • Migraine    • Mumps     history of   • Peptic ulcer    • Pleurisy    • Rheumatic fever     history of   • [place] fever     unspecified, " history of   • Sleep apnea with use of continuous positive airway pressure (CPAP)    • Snoring     sleep study done   • Stomach ulcer    • Transfusion history    • Tuberculosis     never had tb, was exposed as a child, she reports that she has never been infected with TB   • Urinary incontinence    • Venereal disease     test +, history of     Past Surgical History:   Procedure Laterality Date   • PENETRATING KERATOPLASTY Right 4/24/2018    Procedure: PENETRATING KERATOPLASTY;  Surgeon: Moises Lennon M.D.;  Location: SURGERY SAME DAY [facility];  Service: Ophthalmology   • EXTENSOR TENDON REPAIR Right 11/6/2017    Procedure: RT EXTENSOR TENDON REPAIR INDICIS PROPRIUS TO EXTENSOR POLLICIS LONGUS TRANSFER, POSSIBLE PALMARIS LONGUS HARVEST AND TRANSFER;  Surgeon: Tahir Garnett M.D.;  Location: SURGERY [facility];  Service: Orthopedics   • COLONOSCOPY  2016   • KNEE REPLACEMENT, TOTAL Bilateral 2014   • PELVIC RECONSTRUCTION LAPAROSCOPIC N/A 2006   • OTHER  1993    R eye lense   • CHOLECYSTECTOMY  1978   • ABDOMINAL HYSTERECTOMY TOTAL  1976   • CERVICAL FUSION POSTERIOR N/A 1965   • ABDOMINAL EXPLORATION      Gall bladder removal   • CAROTID ENDARTERECTOMY Left    • OTHER ORTHOPEDIC SURGERY Right     shoulder rotator cuff repair   • OTHER ORTHOPEDIC SURGERY Bilateral     total knee replacement     Family History   Problem Relation Age of Onset   • Cancer Mother    • Arthritis Mother    • Heart Disease Mother    • Lung Disease Father    • Arthritis Father    • Heart Disease Father      Social History     Social History   • Marital status:      Spouse name: N/A   • Number of children: N/A   • Years of education: N/A     Occupational History   • Not on file.     Social History Main Topics   • Smoking status: Former Smoker     Packs/day: 0.50     Years: 1.00     Types: Cigarettes     Quit date: 6/23/1972   • Smokeless tobacco: Never Used   • Alcohol use Yes      Comment: occasional, 1 x a year   •  Drug use: No   • Sexual activity: Not on file     Other Topics Concern   • Not on file     Social History Narrative   • No narrative on file     Allergies   Allergen Reactions   • Darvon [Propoxyphene] Shortness of Breath and Swelling     Lip swelling, sob- reaction happens quickly   • Food Shortness of Breath and Swelling     Fish and shellfish- blisters in mouth, throat, throat swelling, sob   • Iodine Anaphylaxis     allergic to shellfish- anaphylaxis   • Penicillins Shortness of Breath, Rash and Swelling     Rash, swelling, sob   • Shellfish Allergy Anaphylaxis     anaphylaxis   • Seasonal      Runny nose   • Codeine Rash and Itching     Rash, itching     Outpatient Encounter Prescriptions as of 7/2/2019   Medication Sig Dispense Refill   • Spacer/Aero-Holding Chambers (MICROCHAMBER) Misc   0   • budesonide-formoterol (SYMBICORT) 160-4.5 MCG/ACT Aerosol Inhale 2 Puffs by mouth 2 Times a Day. 1 Inhaler 3   • Mometasone Furo-Formoterol Fum 200-5 MCG/ACT Aerosol Inhale 2 Puffs by mouth 2 Times a Day. 1 Inhaler 3   • albuterol 108 (90 Base) MCG/ACT Aero Soln inhalation aerosol Inhale 2 Puffs by mouth every 6 hours as needed for Shortness of Breath. 8.5 g 3   • gabapentin (NEURONTIN) 800 MG tablet TAKE 1 TABLET THREE TIMES DAILY 270 Tab 3   • citalopram (CELEXA) 10 MG tablet TAKE 1 TABLET EVERY DAY 90 Tab 3   • ramipril (ALTACE) 10 MG capsule TAKE 1 CAPSULE EVERY MORNING 90 Cap 3   • dorzolamide-timolol (COSOPT) 22.3-6.8 MG/ML Solution      • ezetimibe (ZETIA) 10 MG Tab Take 1 Tab by mouth every day. 30 Tab 3   • Cyanocobalamin (B-12 PO) Take  by mouth.     • metoprolol (LOPRESSOR) 100 MG Tab TAKE 1 TABLET AT BEDTIME 90 Tab 3   • pantoprazole (PROTONIX) 40 MG Tablet Delayed Response TAKE 1 TABLET EVERY DAY 90 Tab 3   • fenofibrate (TRIGLIDE) 160 MG tablet TAKE 1 TABLET BY MOUTH ONCE DAILY 90 Tab 3   • estradiol (ESTRACE) 1 MG Tab TAKE 1 & 1/2 (ONE & ONE-HALF) TABLETS BY MOUTH ONCE DAILY 135 Tab 3   • prednisoLONE  "acetate (PRED FORTE) 1 % Suspension      • aspirin (ASA) 81 MG Chew Tab chewable tablet Take 81 mg by mouth every day.     • vitamin D (CHOLECALCIFEROL) 1000 UNIT Tab Take 1,000 Units by mouth every day.     • Multiple Vitamins-Calcium (ONE-A-DAY WOMENS PO) Take 1 Tab by mouth every day.     • POTASSIUM CHLORIDE PO Take 1 Tab by mouth every day.     • MELATONIN PO Take 1 Tab by mouth every bedtime.     • Cetirizine HCl (ZYRTEC ALLERGY PO) Take 1 Tab by mouth every day.     • NON SPECIFIED SLEEP APNEA MACHINE       No facility-administered encounter medications on file as of 7/2/2019.      Review of Systems   Constitutional: Negative for malaise/fatigue.   Respiratory: Negative for shortness of breath.    Cardiovascular: Negative for chest pain, palpitations, orthopnea, leg swelling and PND.   Gastrointestinal: Negative for abdominal pain.   Musculoskeletal: Positive for back pain. Negative for falls.   Neurological: Negative for dizziness and loss of consciousness.   Psychiatric/Behavioral: Negative for depression.   All other systems reviewed and are negative.       Objective:   /70 (BP Location: Left arm, Patient Position: Sitting)   Pulse 70   Ht 1.664 m (5' 5.5\")   Wt 104.3 kg (230 lb)   LMP 01/01/1976 (Approximate)   SpO2 91%   BMI 37.69 kg/m²      Physical Exam   Constitutional: She is oriented to person, place, and time. She appears well-developed and well-nourished. No distress.   HENT:   Head: Normocephalic and atraumatic.   Eyes: Conjunctivae are normal. No scleral icterus.   Neck: Normal range of motion. Neck supple.   Cardiovascular: Normal rate, regular rhythm and normal heart sounds.  Exam reveals no gallop and no friction rub.    No murmur heard.  Pulmonary/Chest: Effort normal and breath sounds normal. No respiratory distress. She has no wheezes. She has no rales.   Abdominal: Soft. She exhibits no distension. There is no tenderness.   Musculoskeletal: She exhibits no edema.   "   Neurological: She is alert and oriented to person, place, and time.   Skin: Skin is warm and dry. She is not diaphoretic.   Psychiatric: She has a normal mood and affect. Her behavior is normal.   Nursing note and vitals reviewed.    Abdominal ultrasound performed in September 2018 showed no AAA.    KIMANI performed in February 2017 did not show any wall motion abnormalities.  Normal LV function.  However patient only exercised for 4 minutes on the Keo protocol.    Myocardial perfusion study performed in September 2018 did not show any fixed or reversible defects.    Labs performed in April 2019 were reviewed and showed normal hemoglobin.  Normal creatinine.  LDL 99.    Echocardiogram performed April 2019 was personally reviewed and per my interpretation showed normal LV systolic function.  RVSP 44 mmHg.    Holter monitor performed April 2019 was personally reviewed and per my interpretation showed sinus rhythm.  No sustained arrhythmias.    Assessment:     1. Mixed hyperlipidemia     2. Hypertension, essential     3. Shortness of breath     4. Encounter for long-term (current) use of high-risk medication         Medical Decision Making:  Today's Assessment / Status / Plan:     Dyspnea on exertion: Likely secondary to her asthma.  Since being on inhaler therapy, her symptoms are significantly improved.  Continue asthma treatment per patient's PCP.  Her cardiac work-up was unremarkable as discussed above.  If she has a change in symptoms, she should let us know.    Hypertension: Blood pressure is at goal.  Continue current medication regimen including ramipril.  Her renal function has been normal.    Hyperlipidemia: LDL at goal.  Continue fenofibrate and Zetia at current dose.    Return to clinic in 1 year or earlier if needed.    Thank you for allowing me to participate in the care of this patient. Please do not hesitate to contact me with any questions.    Yudith Wade MD  Cardiologist  [facility]      PLEASE NOTE: This dictation was created using voice recognition software.         Arun Cheema M.D.  [address] [facility] [place] [address]  VIA In Basket

## 2019-07-02 NOTE — PROGRESS NOTES
"Chief Complaint   Patient presents with   • Shortness of Breath   • HTN (Controlled)       Subjective:   Sylvia Diallo is a 69-year-old female presenting to clinic for follow-up on dyspnea and hypertension.    Pertinent history:  Carotid stenosis status post left CEA in 2005  Hypertension  Hyperlipidemia, intolerant to statins  Obstructive sleep apnea on CPAP  History of GI bleed secondary to peptic ulcer    Since her last visit, she was diagnosed with asthma and is now on inhalers which has significantly improved her symptoms.  She has been able to work in her garden without any restrictions.    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  She does not have enough time to exercise but does try to stay active as much as possible.    Her blood pressures have been well controlled, like today.    For her hyperlipidemia she is currently on Zetia and fenofibrate which she is tolerating well.    Her activity is limited by back pain.    Past Medical History:   Diagnosis Date   • Anxiety    • Anxiety    • Arthritis    • Back pain    • Blind right eye    • Bowel habit changes     IBS   • Carotid disease, bilateral (HCC) January 2017    Carotid ultrasound with mild stenosis bilaterally.   • Chicken pox     history of   • Depression    • EBV exposure    • Gallbladder disease    • Headache(784.0)    • Heart burn     medicated, \"not so bad anymore\"   • Heart disease    • Hemorrhoids    • Herpes     history of   • High cholesterol    • Hypertension    • Measles     history of   • Migraine    • Mumps     history of   • Peptic ulcer    • Pleurisy    • Rheumatic fever     history of   • [place] fever     unspecified, history of   • Sleep apnea with use of continuous positive airway pressure (CPAP)    • Snoring     sleep study done   • Stomach ulcer    • Transfusion history    • Tuberculosis     never had tb, was exposed as a child, she reports that she has never been infected with TB   • Urinary " incontinence    • Venereal disease     test +, history of     Past Surgical History:   Procedure Laterality Date   • PENETRATING KERATOPLASTY Right 4/24/2018    Procedure: PENETRATING KERATOPLASTY;  Surgeon: Moises Lennon M.D.;  Location: SURGERY SAME DAY [facility];  Service: Ophthalmology   • EXTENSOR TENDON REPAIR Right 11/6/2017    Procedure: RT EXTENSOR TENDON REPAIR INDICIS PROPRIUS TO EXTENSOR POLLICIS LONGUS TRANSFER, POSSIBLE PALMARIS LONGUS HARVEST AND TRANSFER;  Surgeon: Tahir Garnett M.D.;  Location: SURGERY [facility];  Service: Orthopedics   • COLONOSCOPY  2016   • KNEE REPLACEMENT, TOTAL Bilateral 2014   • PELVIC RECONSTRUCTION LAPAROSCOPIC N/A 2006   • OTHER  1993    R eye lense   • CHOLECYSTECTOMY  1978   • ABDOMINAL HYSTERECTOMY TOTAL  1976   • CERVICAL FUSION POSTERIOR N/A 1965   • ABDOMINAL EXPLORATION      Gall bladder removal   • CAROTID ENDARTERECTOMY Left    • OTHER ORTHOPEDIC SURGERY Right     shoulder rotator cuff repair   • OTHER ORTHOPEDIC SURGERY Bilateral     total knee replacement     Family History   Problem Relation Age of Onset   • Cancer Mother    • Arthritis Mother    • Heart Disease Mother    • Lung Disease Father    • Arthritis Father    • Heart Disease Father      Social History     Social History   • Marital status:      Spouse name: N/A   • Number of children: N/A   • Years of education: N/A     Occupational History   • Not on file.     Social History Main Topics   • Smoking status: Former Smoker     Packs/day: 0.50     Years: 1.00     Types: Cigarettes     Quit date: 6/23/1972   • Smokeless tobacco: Never Used   • Alcohol use Yes      Comment: occasional, 1 x a year   • Drug use: No   • Sexual activity: Not on file     Other Topics Concern   • Not on file     Social History Narrative   • No narrative on file     Allergies   Allergen Reactions   • Darvon [Propoxyphene] Shortness of Breath and Swelling     Lip swelling, sob- reaction happens quickly   •  Food Shortness of Breath and Swelling     Fish and shellfish- blisters in mouth, throat, throat swelling, sob   • Iodine Anaphylaxis     allergic to shellfish- anaphylaxis   • Penicillins Shortness of Breath, Rash and Swelling     Rash, swelling, sob   • Shellfish Allergy Anaphylaxis     anaphylaxis   • Seasonal      Runny nose   • Codeine Rash and Itching     Rash, itching     Outpatient Encounter Prescriptions as of 7/2/2019   Medication Sig Dispense Refill   • Spacer/Aero-Holding Chambers (MICROCHAMBER) Misc   0   • budesonide-formoterol (SYMBICORT) 160-4.5 MCG/ACT Aerosol Inhale 2 Puffs by mouth 2 Times a Day. 1 Inhaler 3   • Mometasone Furo-Formoterol Fum 200-5 MCG/ACT Aerosol Inhale 2 Puffs by mouth 2 Times a Day. 1 Inhaler 3   • albuterol 108 (90 Base) MCG/ACT Aero Soln inhalation aerosol Inhale 2 Puffs by mouth every 6 hours as needed for Shortness of Breath. 8.5 g 3   • gabapentin (NEURONTIN) 800 MG tablet TAKE 1 TABLET THREE TIMES DAILY 270 Tab 3   • citalopram (CELEXA) 10 MG tablet TAKE 1 TABLET EVERY DAY 90 Tab 3   • ramipril (ALTACE) 10 MG capsule TAKE 1 CAPSULE EVERY MORNING 90 Cap 3   • dorzolamide-timolol (COSOPT) 22.3-6.8 MG/ML Solution      • ezetimibe (ZETIA) 10 MG Tab Take 1 Tab by mouth every day. 30 Tab 3   • Cyanocobalamin (B-12 PO) Take  by mouth.     • metoprolol (LOPRESSOR) 100 MG Tab TAKE 1 TABLET AT BEDTIME 90 Tab 3   • pantoprazole (PROTONIX) 40 MG Tablet Delayed Response TAKE 1 TABLET EVERY DAY 90 Tab 3   • fenofibrate (TRIGLIDE) 160 MG tablet TAKE 1 TABLET BY MOUTH ONCE DAILY 90 Tab 3   • estradiol (ESTRACE) 1 MG Tab TAKE 1 & 1/2 (ONE & ONE-HALF) TABLETS BY MOUTH ONCE DAILY 135 Tab 3   • prednisoLONE acetate (PRED FORTE) 1 % Suspension      • aspirin (ASA) 81 MG Chew Tab chewable tablet Take 81 mg by mouth every day.     • vitamin D (CHOLECALCIFEROL) 1000 UNIT Tab Take 1,000 Units by mouth every day.     • Multiple Vitamins-Calcium (ONE-A-DAY WOMENS PO) Take 1 Tab by mouth every day.    "  • POTASSIUM CHLORIDE PO Take 1 Tab by mouth every day.     • MELATONIN PO Take 1 Tab by mouth every bedtime.     • Cetirizine HCl (ZYRTEC ALLERGY PO) Take 1 Tab by mouth every day.     • NON SPECIFIED SLEEP APNEA MACHINE       No facility-administered encounter medications on file as of 7/2/2019.      Review of Systems   Constitutional: Negative for malaise/fatigue.   Respiratory: Negative for shortness of breath.    Cardiovascular: Negative for chest pain, palpitations, orthopnea, leg swelling and PND.   Gastrointestinal: Negative for abdominal pain.   Musculoskeletal: Positive for back pain. Negative for falls.   Neurological: Negative for dizziness and loss of consciousness.   Psychiatric/Behavioral: Negative for depression.   All other systems reviewed and are negative.       Objective:   /70 (BP Location: Left arm, Patient Position: Sitting)   Pulse 70   Ht 1.664 m (5' 5.5\")   Wt 104.3 kg (230 lb)   LMP 01/01/1976 (Approximate)   SpO2 91%   BMI 37.69 kg/m²     Physical Exam   Constitutional: She is oriented to person, place, and time. She appears well-developed and well-nourished. No distress.   HENT:   Head: Normocephalic and atraumatic.   Eyes: Conjunctivae are normal. No scleral icterus.   Neck: Normal range of motion. Neck supple.   Cardiovascular: Normal rate, regular rhythm and normal heart sounds.  Exam reveals no gallop and no friction rub.    No murmur heard.  Pulmonary/Chest: Effort normal and breath sounds normal. No respiratory distress. She has no wheezes. She has no rales.   Abdominal: Soft. She exhibits no distension. There is no tenderness.   Musculoskeletal: She exhibits no edema.   Neurological: She is alert and oriented to person, place, and time.   Skin: Skin is warm and dry. She is not diaphoretic.   Psychiatric: She has a normal mood and affect. Her behavior is normal.   Nursing note and vitals reviewed.    Abdominal ultrasound performed in September 2018 showed no " AAA.    KIMANI performed in February 2017 did not show any wall motion abnormalities.  Normal LV function.  However patient only exercised for 4 minutes on the Keo protocol.    Myocardial perfusion study performed in September 2018 did not show any fixed or reversible defects.    Labs performed in April 2019 were reviewed and showed normal hemoglobin.  Normal creatinine.  LDL 99.    Echocardiogram performed April 2019 was personally reviewed and per my interpretation showed normal LV systolic function.  RVSP 44 mmHg.    Holter monitor performed April 2019 was personally reviewed and per my interpretation showed sinus rhythm.  No sustained arrhythmias.    Assessment:     1. Mixed hyperlipidemia     2. Hypertension, essential     3. Shortness of breath     4. Encounter for long-term (current) use of high-risk medication         Medical Decision Making:  Today's Assessment / Status / Plan:     Dyspnea on exertion: Likely secondary to her asthma.  Since being on inhaler therapy, her symptoms are significantly improved.  Continue asthma treatment per patient's PCP.  Her cardiac work-up was unremarkable as discussed above.  If she has a change in symptoms, she should let us know.    Hypertension: Blood pressure is at goal.  Continue current medication regimen including ramipril.  Her renal function has been normal.    Hyperlipidemia: LDL at goal.  Continue fenofibrate and Zetia at current dose.    Return to clinic in 1 year or earlier if needed.    Thank you for allowing me to participate in the care of this patient. Please do not hesitate to contact me with any questions.    Yudith Wade MD  Cardiologist  [facility]      PLEASE NOTE: This dictation was created using voice recognition software.

## 2019-07-25 PROBLEM — J45.30 MILD PERSISTENT ASTHMA WITHOUT COMPLICATION: Status: ACTIVE | Noted: 2019-07-25

## 2019-10-07 DIAGNOSIS — E78.5 HYPERLIPIDEMIA, UNSPECIFIED HYPERLIPIDEMIA TYPE: ICD-10-CM

## 2019-10-08 RX ORDER — EZETIMIBE 10 MG/1
TABLET ORAL
Qty: 90 TAB | Refills: 3 | Status: SHIPPED | OUTPATIENT
Start: 2019-10-08 | End: 2020-10-12

## 2019-12-09 PROBLEM — Z53.21 PROCEDURE AND TREATMENT NOT CARRIED OUT DUE TO PATIENT LEAVING PRIOR TO BEING SEEN BY HEALTH CARE PROVIDER: Status: ACTIVE | Noted: 2019-12-09

## 2020-02-04 PROBLEM — B00.9 HSV (HERPES SIMPLEX VIRUS) INFECTION: Status: ACTIVE | Noted: 2020-02-04

## 2020-03-23 ENCOUNTER — HOSPITAL ENCOUNTER (OUTPATIENT)
Dept: LAB | Age: 70
Discharge: HOME | End: 2020-03-23

## 2020-03-23 ENCOUNTER — OFFICE VISIT (OUTPATIENT)
Dept: FAMILY MEDICINE CLINIC | Age: 70
End: 2020-03-23

## 2020-03-23 VITALS
SYSTOLIC BLOOD PRESSURE: 150 MMHG | OXYGEN SATURATION: 97 % | HEART RATE: 93 BPM | BODY MASS INDEX: 23.79 KG/M2 | TEMPERATURE: 98.5 F | WEIGHT: 157 LBS | RESPIRATION RATE: 16 BRPM | DIASTOLIC BLOOD PRESSURE: 85 MMHG | HEIGHT: 68 IN

## 2020-03-23 DIAGNOSIS — I10 HYPERTENSION, UNSPECIFIED TYPE: ICD-10-CM

## 2020-03-23 DIAGNOSIS — E78.2 MIXED HYPERLIPIDEMIA: ICD-10-CM

## 2020-03-23 DIAGNOSIS — Z13.29 SCREENING FOR HYPOTHYROIDISM: ICD-10-CM

## 2020-03-23 DIAGNOSIS — J30.89 SEASONAL ALLERGIC RHINITIS DUE TO OTHER ALLERGIC TRIGGER: ICD-10-CM

## 2020-03-23 DIAGNOSIS — E87.1 HYPONATREMIA: ICD-10-CM

## 2020-03-23 DIAGNOSIS — I10 HYPERTENSION, UNSPECIFIED TYPE: Primary | ICD-10-CM

## 2020-03-23 LAB
ALBUMIN SERPL-MCNC: 4 G/DL (ref 3.5–5)
ALBUMIN/GLOB SERPL: 1 {RATIO} (ref 1.1–2.2)
ALP SERPL-CCNC: 103 U/L (ref 45–117)
ALT SERPL-CCNC: 26 U/L (ref 12–78)
ANION GAP SERPL CALC-SCNC: 6 MMOL/L (ref 5–15)
AST SERPL-CCNC: 26 U/L (ref 15–37)
BILIRUB SERPL-MCNC: 0.5 MG/DL (ref 0.2–1)
BUN SERPL-MCNC: 21 MG/DL (ref 6–20)
BUN/CREAT SERPL: 18 (ref 12–20)
CALCIUM SERPL-MCNC: 9.5 MG/DL (ref 8.5–10.1)
CHLORIDE SERPL-SCNC: 98 MMOL/L (ref 97–108)
CHOLEST SERPL-MCNC: 246 MG/DL
CO2 SERPL-SCNC: 28 MMOL/L (ref 21–32)
CREAT SERPL-MCNC: 1.17 MG/DL (ref 0.55–1.02)
GLOBULIN SER CALC-MCNC: 4.1 G/DL (ref 2–4)
GLUCOSE SERPL-MCNC: 98 MG/DL (ref 65–100)
HDLC SERPL-MCNC: 80 MG/DL
HDLC SERPL: 3.1 {RATIO} (ref 0–5)
LDLC SERPL CALC-MCNC: 146 MG/DL (ref 0–100)
LIPID PROFILE,FLP: ABNORMAL
POTASSIUM SERPL-SCNC: 3.8 MMOL/L (ref 3.5–5.1)
PROT SERPL-MCNC: 8.1 G/DL (ref 6.4–8.2)
SODIUM SERPL-SCNC: 132 MMOL/L (ref 136–145)
TRIGL SERPL-MCNC: 100 MG/DL (ref ?–150)
TSH SERPL DL<=0.05 MIU/L-ACNC: 2.91 UIU/ML (ref 0.36–3.74)
VLDLC SERPL CALC-MCNC: 20 MG/DL

## 2020-03-23 RX ORDER — LOSARTAN POTASSIUM AND HYDROCHLOROTHIAZIDE 12.5; 5 MG/1; MG/1
TABLET ORAL
COMMUNITY
Start: 2020-02-22 | End: 2020-03-23 | Stop reason: SDUPTHER

## 2020-03-23 RX ORDER — LOSARTAN POTASSIUM AND HYDROCHLOROTHIAZIDE 12.5; 5 MG/1; MG/1
1 TABLET ORAL DAILY
Qty: 90 TAB | Refills: 1 | Status: SHIPPED | OUTPATIENT
Start: 2020-03-23 | End: 2021-01-17

## 2020-03-23 NOTE — PROGRESS NOTES
Chief Complaint   Patient presents with    New Patient    Establish Care    Hypertension     Patient states thinks sinus are causing bp to elevate.  Medication Refill     she is a 65y.o. year old female who presents for evalution. She is here c/o \"sinus \" problem. She has achy eyes and nasal congestion. She uses the flonase and that helps a little bit  She has been out only to the grocery store. The last time she had large group was [place] 8 days ago. She has seasonal allergy complaints last year and she thjinks this is the same thing          She takes hyzaar but takes 1/2 tab  She took herself off of the cholesterol med due to cost  She last had the blood test in January        Reviewed PmHx, RxHx, FmHx, SocHx, AllgHx and updated and dated in the chart. Aspirin yes ____   No____ N/A____    There are no active problems to display for this patient.       Nurse notes were reviewed and copied and are correct  Review of Systems - negative except as listed above in the HPI    Objective:     Vitals:    03/23/20 0933 03/23/20 1000   BP: 182/82 150/85   Pulse: 93    Resp: 16    Temp: 98.5 °F (36.9 °C)    TempSrc: Oral    SpO2: 97%    Weight: 157 lb (71.2 kg)    Height: 5' 8\" (1.727 m)        Physical Examination: General appearance - alert, well appearing, and in no distress  Mental status - alert, oriented to person, place, and time  Ears - bilateral TM's and external ear canals normal  Mouth - mucous membranes moist, pharynx normal without lesions  Neck - supple, no significant adenopathy  Chest - clear to auscultation, no wheezes, rales or rhonchi, symmetric air entry  Heart - normal rate, regular rhythm, normal S1, S2, no murmurs, rubs, clicks or gallops  Abdomen - soft, nontender, nondistended, no masses or organomegaly  Neurological - alert, oriented, normal speech, no focal findings or movement disorder noted  Musculoskeletal - no joint tenderness, deformity or swelling  Extremities - peripheral pulses normal, no pedal edema, no clubbing or cyanosis  Skin - normal coloration and turgor, no rashes, no suspicious skin lesions noted      Assessment/ Plan:   Diagnoses and all orders for this visit:    1. Hypertension, unspecified type  -     METABOLIC PANEL, COMPREHENSIVE; Future  -     LIPID PANEL; Future  -     losartan-hydroCHLOROthiazide (HYZAAR) 50-12.5 mg per tablet; Take 1 Tab by mouth daily. Go back to the whole hyzaar tab , 50-12. 5. she has been taking half tab  Recheck by video visit in 2 weeks  She will need to use her home bp cuff  I counseled for more than 50% of this more than 50 min visit on the importance of low sodium diet and avoiding junk foods and fast food  2. Seasonal allergic rhinitis due to other allergic trigger  Use otc allergy med that she has been using  3. Mixed hyperlipidemia  -     LIPID PANEL; Future  Check lipids and treat as indicated  4. Screening for hypothyroidism  -     TSH 3RD GENERATION; Future           ICD-10-CM ICD-9-CM    1. Hypertension, unspecified type C89 133.1 METABOLIC PANEL, COMPREHENSIVE      LIPID PANEL      losartan-hydroCHLOROthiazide (HYZAAR) 50-12.5 mg per tablet   2. Seasonal allergic rhinitis due to other allergic trigger J30.89 477.8    3. Mixed hyperlipidemia E78.2 272.2 LIPID PANEL   4. Screening for hypothyroidism Z13.29 V77.0 TSH 3RD GENERATION       I have discussed the diagnosis with the patient and the intended plan as seen in the above orders. The patient has received an after-visit summary and questions were answered concerning future plans. Medication Side Effects and Warnings were discussed with patient: yes  Patient Labs were reviewed and or requested: yes  Patient Past Records were reviewed and or requested: yes        There are no Patient Instructions on file for this visit.     The patient verbalizes understanding and agrees with the plan of care        Patient has the advanced directives booklet to review

## 2020-03-23 NOTE — PROGRESS NOTES
1. Have you been to the ER, urgent care clinic since your last visit? Hospitalized since your last visit? Yes When: 8/2019 Where: Patient reports  Reason for visit: Back Spasms    2. Have you seen or consulted any other health care providers outside of the [address] [place] [address] since your last visit? Include any pap smears or colon screening. No         Chief Complaint   Patient presents with    New Patient    Establish Care    Hypertension     Patient states thinks sinus are causing bp to elevate.  Medication Refill     Patient states took medication last night.     Visit Vitals  /82 (BP 1 Location: Left arm, BP Patient Position: Sitting)   Pulse 93   Temp 98.5 °F (36.9 °C) (Oral)   Resp 16   Ht 5' 8\" (1.727 m)   Wt 157 lb (71.2 kg)   SpO2 97%   BMI 23.87 kg/m²

## 2020-04-05 NOTE — PROGRESS NOTES
The kidney test is slightly abnormal and the sodium is lower than normal. This needs to be repeated. There are [facility] locations available and there is a drawing station open at [facility]  Please go in to recheck this next week. Wear something to cover your nose and mouth and a pair of gloves of any type  As an attempt to stop the spread of the corona virus we are postponing in office routine visits . In the meantime I will be offering video visits. If you wish to do that  speak to my office staff. The cholesterol is slightly above normal and needs improvement. Avoid fried food, fast food and junk food. Also move more each day. Try not to sit for more than an hour at a time.  I want to recheck the cholesterol levels in three months

## 2020-04-06 ENCOUNTER — TELEPHONE (OUTPATIENT)
Dept: FAMILY MEDICINE CLINIC | Age: 70
End: 2020-04-06

## 2020-04-06 NOTE — TELEPHONE ENCOUNTER
Two patient Identification confirmed. I spoke with the patient about The kidney test is slightly abnormal and the sodium is lower than normal. This needs to be repeated. There are [facility] locations available and [facility]   Please go in to recheck this next week. Wear something to cover your nose and mouth and a pair of gloves of any type. The cholesterol is slightly above normal and needs improvement. Avoid fried food, fast food and junk food. Also move more each day. Try not to sit for more than an hour at a time. I want to recheck the cholesterol levels in three months. Patient verbalized understanding. Patient states will contact her daughter to assist with getting labs completed this week.

## 2020-06-15 DIAGNOSIS — R68.89 ABNORMALITY ON SCREENING TEST: Primary | ICD-10-CM

## 2020-06-15 RX ORDER — ALENDRONATE SODIUM 35 MG/1
35 TABLET ORAL
Qty: 9 TAB | Refills: 3 | Status: SHIPPED | OUTPATIENT
Start: 2020-06-15

## 2020-06-16 NOTE — PROGRESS NOTES
cologard was positive   needs a colonoscopy to get better check on colon to look for polyps or cancer  Referral entered to GI. Please send that to her and explain she had abn cologard and the next step is to get colonoscopy to see what is going on in the colon. Also of note she has thinning bone.  I am sending rx for fosamax to strengthen the bones

## 2020-06-17 PROBLEM — E78.1 HYPERTRIGLYCERIDEMIA: Status: ACTIVE | Noted: 2020-06-17

## 2020-06-18 ENCOUNTER — DOCUMENTATION ONLY (OUTPATIENT)
Dept: FAMILY MEDICINE CLINIC | Age: 70
End: 2020-06-18

## 2020-07-20 ENCOUNTER — VIRTUAL VISIT (OUTPATIENT)
Dept: FAMILY MEDICINE CLINIC | Age: 70
End: 2020-07-20

## 2020-07-20 DIAGNOSIS — Z00.00 MEDICARE ANNUAL WELLNESS VISIT, SUBSEQUENT: Primary | ICD-10-CM

## 2020-07-20 DIAGNOSIS — I10 HYPERTENSION, UNSPECIFIED TYPE: ICD-10-CM

## 2020-07-20 DIAGNOSIS — Z13.31 SCREENING FOR DEPRESSION: ICD-10-CM

## 2020-07-20 DIAGNOSIS — Z71.89 ADVANCED DIRECTIVES, COUNSELING/DISCUSSION: ICD-10-CM

## 2020-07-20 DIAGNOSIS — Z13.39 SCREENING FOR ALCOHOLISM: ICD-10-CM

## 2020-07-20 RX ORDER — ZINC GLUCONATE 10 MG
250 LOZENGE ORAL
COMMUNITY

## 2020-07-20 NOTE — ACP (ADVANCE CARE PLANNING)
Advance Care Planning       Advance Care Planning (ACP) Physician/NP/PA (Provider) Conversation        Date of ACP Conversation: 7/20/2020    Conversation Conducted with:   Patient with Decision Making Branden Bautista Maker:    Current Designated Health Care Decision Maker:   (If there is a valid [place] named in the [address]" box in the ACP activity, but it is not visible above, be sure to open that field and then select the health care decision maker relationship (ie \"primary\") in the blank space to the right of the name.)    Note: Assess and validate information in current ACP documents, as indicated. If no Authorized Decision Maker has previously been identified, then patient chooses [place]:  \"Who would you like to name as your primary health care decision-maker? \"    Name: Grecia Velázquez   Relationship: daughter Phone number: [phone]  Nelida Cruz this person be reached easily? \" YES      Note: If the relationship of these Decision-Makers to the patient does NOT follow your state's Next of Kin hierarchy, recommend that patient complete ACP document that meets state-specific requirements to allow them to act on the patient's behalf when appropriate. Care Preferences:    Hospitalization: \"If your health worsens and it becomes clear that your chance of recovery is unlikely, what would your preference be regarding hospitalization? \"  If the patient would want hospitalization, answer \"yes\". If the patient would prefer comfort-focused treatment without hospitalization, answer \"no\". yes      Ventilation: \"If you were in your present state of health and suddenly became very ill and were unable to breathe on your own, what would your preference be about the use of a ventilator (breathing machine) if it was available to you? \"    If patient would desire the use of a ventilator (breathing machine), answer \"yes\", if not answer \"no\":no    \"If your health worsens and it becomes clear that your chance of recovery is unlikely, what would your preference be about the use of a ventilator (breathing machine) if it was available to you? \"   no      Resuscitation:  \"CPR works best to restart the heart when there is a sudden event, like a heart attack, in someone who is otherwise healthy. Unfortunately, CPR does not typically restart the heart for people who have serious health conditions or who are very sick. \"    \"In the event your heart stopped as a result of an underlying serious health condition, would you want attempts to be made to restart your heart (answer \"yes\" for attempt to resuscitate) or would you prefer a natural death (answer \"no\" for do not attempt to resuscitate)? \"   no    NOTE: If the patient has a valid advance directive AND provides care preference(s) that are inconsistent with that prior directive, advise the patient to consider either: creating a new advance directive that complies with state-specific requirements; or, if that is not possible, orally revoking that prior directive in accordance with state-specific requirements, which must be documented in the EHR.     Conversation Outcomes / Follow-Up Plan:   Completed new Advance Directive      Length of Voluntary ACP Conversation in minutes:  16 minutes      Korina Perez MD

## 2020-07-20 NOTE — PATIENT INSTRUCTIONS
Medicare Wellness Visit, Female     The best way to live healthy is to have a lifestyle where you eat a well-balanced diet, exercise regularly, limit alcohol use, and quit all forms of tobacco/nicotine, if applicable. Regular preventive services are another way to keep healthy. Preventive services (vaccines, screening tests, monitoring & exams) can help personalize your care plan, which helps you manage your own care. Screening tests can find health problems at the earliest stages, when they are easiest to treat. [facility] follows the current, evidence-based guidelines published by the [facility] Rogelio Chavez ([facility]) when recommending preventive services for our patients. Because we follow these guidelines, sometimes recommendations change over time as research supports it. (For example, mammograms used to be recommended annually. Even though Medicare will still pay for an annual mammogram, the newer guidelines recommend a mammogram every two years for women of average risk). Of course, you and your doctor may decide to screen more often for some diseases, based on your risk and your co-morbidities (chronic disease you are already diagnosed with). Preventive services for you include:  - Medicare offers their members a free annual wellness visit, which is time for you and your primary care provider to discuss and plan for your preventive service needs. Take advantage of this benefit every year!  -All adults over the age of 72 should receive the recommended pneumonia vaccines. Current USPSTF guidelines recommend a series of two vaccines for the best pneumonia protection.   -All adults should have a flu vaccine yearly and a tetanus vaccine every 10 years.   -All adults age 48 and older should receive the shingles vaccines (series of two vaccines).       -All adults age 38-68 who are overweight should have a diabetes screening test once every three years.   -All adults born between [birth year] and 1965 should be screened once for Hepatitis C.  -Other screening tests and preventive services for persons with diabetes include: an eye exam to screen for diabetic retinopathy, a kidney function test, a foot exam, and stricter control over your cholesterol.   -Cardiovascular screening for adults with routine risk involves an electrocardiogram (ECG) at intervals determined by your doctor.   -Colorectal cancer screenings should be done for adults age 54-65 with no increased risk factors for colorectal cancer. There are a number of acceptable methods of screening for this type of cancer. Each test has its own benefits and drawbacks. Discuss with your doctor what is most appropriate for you during your annual wellness visit. The different tests include: colonoscopy (considered the best screening method), a fecal occult blood test, a fecal DNA test, and sigmoidoscopy.    -A bone mass density test is recommended when a woman turns 65 to screen for osteoporosis. This test is only recommended one time, as a screening. Some providers will use this same test as a disease monitoring tool if you already have osteoporosis. -Breast cancer screenings are recommended every other year for women of normal risk, age 54-69.  -Cervical cancer screenings for women over age 72 are only recommended with certain risk factors.      Here is a list of your current Health Maintenance items (your personalized list of preventive services) with a due date:  Health Maintenance Due   Topic Date Due    Hepatitis C Test  1950    DTaP/Tdap/Td  (1 - Tdap) 02/26/1969    Shingles Vaccine (1 of 2) 02/26/1998    Mammogram  02/26/1998    Colon Cancer Stool Test  02/26/1998    Glaucoma Screening   02/26/2013    Pneumococcal Vaccine (1 of 1 - PPSV23) 02/26/2013    Annual Well Visit  03/23/2020

## 2020-07-20 NOTE — PROGRESS NOTES
Lindsay Koenig is a 65 y.o. 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 Hypertension, unspecified type        12  Subjective:       Prior to Admission medications    Medication Sig Start Date End Date Taking? Authorizing Provider   magnesium 250 mg tab Take 250 mg by mouth. Yes Provider, Historical   losartan-hydroCHLOROthiazide (HYZAAR) 50-12.5 mg per tablet Take 1 Tab by mouth daily. Patient taking differently: Take 0.5 Tabs by mouth daily. 3/23/20  Yes Stacey Hunter MD   alendronate (FOSAMAX) 35 mg tablet Take 1 Tab by mouth every seven (7) days. 6/15/20   Stacey Hunter MD     There are no active problems to display for this patient. Current Outpatient Medications   Medication Sig Dispense Refill    magnesium 250 mg tab Take 250 mg by mouth.  losartan-hydroCHLOROthiazide (HYZAAR) 50-12.5 mg per tablet Take 1 Tab by mouth daily. (Patient taking differently: Take 0.5 Tabs by mouth daily.) 90 Tab 1    alendronate (FOSAMAX) 35 mg tablet Take 1 Tab by mouth every seven (7) days. 9 Tab 3       ROS    No flowsheet data found. Lindsay Koenig, who was evaluated through a patient-initiated, synchronous (real-time) audio only encounter, and/or her healthcare decision maker, is aware that it is a billable service, with coverage as determined by her insurance carrier.  She provided verbal consent to proceed: Yes. She has not had a related appointment within my department in the past 7 days or scheduled within the next 24 hours. Total Time: minutes: 21-30 minutes    Feliberto Garcia MD     This is the Subsequent Medicare Annual Wellness Exam, performed 12 months or more after the Initial AWV or the last Subsequent AWV    I have reviewed the patient's medical history in detail and updated the computerized patient record. History   There is no problem list on file for this patient. No past medical history on file. No past surgical history on file. Current Outpatient Medications   Medication Sig Dispense Refill    magnesium 250 mg tab Take 250 mg by mouth.  losartan-hydroCHLOROthiazide (HYZAAR) 50-12.5 mg per tablet Take 1 Tab by mouth daily. (Patient taking differently: Take 0.5 Tabs by mouth daily.) 90 Tab 1    alendronate (FOSAMAX) 35 mg tablet Take 1 Tab by mouth every seven (7) days. 9 Tab 3     Allergies   Allergen Reactions    Latex Rash    Sulfa (Sulfonamide Antibiotics) Rash       No family history on file. Social History     Tobacco Use    Smoking status: Never Smoker    Smokeless tobacco: Never Used   Substance Use Topics    Alcohol use: Never     Frequency: Never       Depression Risk Factor Screening:     3 most recent PHQ Screens 3/23/2020   Little interest or pleasure in doing things Not at all   Feeling down, depressed, irritable, or hopeless Not at all   Total Score PHQ 2 0       Alcohol Risk Factor Screening:   Do you average 1 drink per night or more than 7 drinks a week:  No    On any one occasion in the past three months have you have had more than 3 drinks containing alcohol:  No      Functional Ability and Level of Safety:   Hearing: Hearing is good. Activities of Daily Living: The home contains: no safety equipment. Patient does total self care     Ambulation: with no difficulty     Fall Risk:  Fall Risk Assessment, last 12 mths 3/23/2020   Able to walk?  Yes Fall in past 12 months? No     Abuse Screen:  Patient is not abused       Cognitive Screening   Has your family/caregiver stated any concerns about your memory: no    Cognitive Screening: Normal - Clock Drawing Test    Patient Care Team   Patient Care Team:  Su Chao MD as PCP - General (Family Medicine)  Su Chao MD as PCP - [address] [place], [place] [id number] Mendoza Provider    Assessment/Plan   Education and counseling provided:  Are appropriate based on today's review and evaluation  End-of-Life planning (with patient's consent)    Diagnoses and all orders for this visit:    1. Medicare annual wellness visit, subsequent    2. Advanced directives, counseling/discussion  -     ADVANCE CARE PLANNING FIRST 30 MINS    3. Screening for alcoholism  -     ME ANNUAL ALCOHOL SCREEN 15 MIN    4. Screening for depression  -     [address] Maintenance Due   Topic Date Due    Hepatitis C Screening  1950    DTaP/Tdap/Td series (1 - Tdap) 02/26/1969    Shingrix Vaccine Age 50> (1 of 2) 02/26/1998    Breast Cancer Screen Mammogram  02/26/1998    FOBT Q1Y Age 50-75  02/26/1998    GLAUCOMA SCREENING Q2Y  02/26/2013    Pneumococcal 65+ years (1 of 1 - PPSV23) 02/26/2013       Akosua Jung, who was evaluated through a synchronous (real-time) audio only encounter, and/or her healthcare decision maker, is aware that it is a billable service, with coverage as determined by her insurance carrier. She provided verbal consent to proceed: Yes, and patient identification was verified. It was conducted pursuant to the emergency declaration under the [facility], [address] [place] [address] authority and the [employer] and [employer] Act. A caregiver was present when appropriate. Ability to conduct physical exam was limited. I was at home. The patient was at home.     Jesu Harris MD

## 2020-07-20 NOTE — PROGRESS NOTES
Patient stated name &   Chief Complaint   Patient presents with    Medication Refill     3 months follow up        Health Maintenance Due   Topic    Hepatitis C Screening     DTaP/Tdap/Td series (1 - Tdap)    Shingrix Vaccine Age 49> (1 of 2)    Breast Cancer Screen Mammogram     FOBT Q1Y Age 54-65     GLAUCOMA SCREENING Q2Y     Pneumococcal 65+ years (1 of 1 - PPSV23)    Medicare Yearly Exam        Wt Readings from Last 3 Encounters:   20 157 lb (71.2 kg)     Temp Readings from Last 3 Encounters:   20 98.5 °F (36.9 °C) (Oral)     BP Readings from Last 3 Encounters:   20 150/85     Pulse Readings from Last 3 Encounters:   20 93         Learning Assessment:  :     No flowsheet data found. Depression Screening:  :     3 most recent PHQ Screens 3/23/2020   Little interest or pleasure in doing things Not at all   Feeling down, depressed, irritable, or hopeless Not at all   Total Score PHQ 2 0       Fall Risk Assessment:  :     Fall Risk Assessment, last 12 mths 3/23/2020   Able to walk? Yes   Fall in past 12 months? No       Abuse Screening:  :     Abuse Screening Questionnaire 3/23/2020   Do you ever feel afraid of your partner? N   Are you in a relationship with someone who physically or mentally threatens you? N   Is it safe for you to go home? Y       Coordination of Care Questionnaire:  :     1) Have you been to an emergency room, urgent care clinic since your last visit? No    Hospitalized since your last visit? No             2) Have you seen or consulted any other health care providers outside of [address] [place] [address] since your last visit? No  (Include any pap smears or colon screenings in this section.)    Patient is accompanied by VV I have received verbal consent from Bandar Dunlap to discuss any/all medical information while they are present in the room.

## 2020-07-21 ENCOUNTER — OFFICE VISIT (OUTPATIENT)
Dept: CARDIOLOGY | Facility: CLINIC | Age: 70
End: 2020-07-21
Payer: MEDICARE

## 2020-07-21 VITALS
DIASTOLIC BLOOD PRESSURE: 76 MMHG | OXYGEN SATURATION: 91 % | SYSTOLIC BLOOD PRESSURE: 140 MMHG | HEART RATE: 62 BPM | WEIGHT: 244 LBS | BODY MASS INDEX: 40.65 KG/M2 | HEIGHT: 65 IN

## 2020-07-21 DIAGNOSIS — R06.02 SHORTNESS OF BREATH: ICD-10-CM

## 2020-07-21 DIAGNOSIS — I10 HYPERTENSION, ESSENTIAL: ICD-10-CM

## 2020-07-21 DIAGNOSIS — E78.2 MIXED HYPERLIPIDEMIA: ICD-10-CM

## 2020-07-21 DIAGNOSIS — Z79.899 ENCOUNTER FOR LONG-TERM (CURRENT) USE OF HIGH-RISK MEDICATION: ICD-10-CM

## 2020-07-21 PROCEDURE — 99215 OFFICE O/P EST HI 40 MIN: CPT | Performed by: INTERNAL MEDICINE

## 2020-07-21 RX ORDER — TERBINAFINE HYDROCHLORIDE 250 MG/1
TABLET ORAL
COMMUNITY
Start: 2020-05-07 | End: 2020-11-13

## 2020-07-21 RX ORDER — HYDROCODONE BITARTRATE AND ACETAMINOPHEN 5; 325 MG/1; MG/1
TABLET ORAL
COMMUNITY
Start: 2020-05-13 | End: 2020-07-21

## 2020-07-21 ASSESSMENT — ENCOUNTER SYMPTOMS
BACK PAIN: 1
PND: 0
ABDOMINAL PAIN: 0
FALLS: 0
ORTHOPNEA: 0
LOSS OF CONSCIOUSNESS: 0
SHORTNESS OF BREATH: 0
DEPRESSION: 0
DIZZINESS: 0
PALPITATIONS: 0

## 2020-07-21 ASSESSMENT — FIBROSIS 4 INDEX: FIB4 SCORE: 1.12
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
## 2020-07-27 DIAGNOSIS — E78.2 MIXED HYPERLIPIDEMIA: Primary | ICD-10-CM

## 2020-07-28 LAB
BUN SERPL-MCNC: 22 MG/DL (ref 8–27)
BUN/CREAT SERPL: 19 (ref 12–28)
CALCIUM SERPL-MCNC: 9.6 MG/DL (ref 8.7–10.3)
CHLORIDE SERPL-SCNC: 99 MMOL/L (ref 96–106)
CHOLEST SERPL-MCNC: 226 MG/DL (ref 100–199)
CO2 SERPL-SCNC: 24 MMOL/L (ref 20–29)
CREAT SERPL-MCNC: 1.17 MG/DL (ref 0.57–1)
GLUCOSE SERPL-MCNC: 83 MG/DL (ref 65–99)
HDLC SERPL-MCNC: 67 MG/DL
LDLC SERPL CALC-MCNC: 143 MG/DL (ref 0–99)
POTASSIUM SERPL-SCNC: 4.4 MMOL/L (ref 3.5–5.2)
SODIUM SERPL-SCNC: 138 MMOL/L (ref 134–144)
TRIGL SERPL-MCNC: 78 MG/DL (ref 0–149)
VLDLC SERPL CALC-MCNC: 16 MG/DL (ref 5–40)

## 2020-08-07 ENCOUNTER — TELEPHONE (OUTPATIENT)
Dept: FAMILY MEDICINE CLINIC | Age: 70
End: 2020-08-07

## 2020-08-07 NOTE — TELEPHONE ENCOUNTER
----- Message from Jesús Mayer MD sent at 8/3/2020  9:18 PM EDT -----  The kidney is abnormal but stable  The cholesterol level is high and is not improving at all. It was mentioned in your note in march that you took yourself off of the cholesterol medication.  We need to discuss the possibility of restarting that medicine when you have your next visit

## 2020-08-07 NOTE — TELEPHONE ENCOUNTER
Unable to reach patient via phone call. Patient needs to call office to update contact information. Letter sent.

## 2020-10-10 DIAGNOSIS — E78.5 HYPERLIPIDEMIA, UNSPECIFIED HYPERLIPIDEMIA TYPE: ICD-10-CM

## 2020-10-12 RX ORDER — EZETIMIBE 10 MG/1
TABLET ORAL
Qty: 90 TAB | Refills: 3 | Status: SHIPPED | OUTPATIENT
Start: 2020-10-12 | End: 2022-03-07 | Stop reason: SDUPTHER

## 2021-01-16 DIAGNOSIS — I10 HYPERTENSION, UNSPECIFIED TYPE: ICD-10-CM

## 2021-01-17 RX ORDER — LOSARTAN POTASSIUM AND HYDROCHLOROTHIAZIDE 12.5; 5 MG/1; MG/1
TABLET ORAL
Qty: 90 TAB | Refills: 1 | Status: SHIPPED | OUTPATIENT
Start: 2021-01-17

## 2021-01-20 ENCOUNTER — TELEPHONE (OUTPATIENT)
Dept: FAMILY MEDICINE CLINIC | Age: 71
End: 2021-01-20

## 2021-01-20 NOTE — TELEPHONE ENCOUNTER
----- Message from Benjamin Ruiz sent at 1/14/2021  1:36 PM EST -----  Regarding: /Refill  Contact: [phone]  Medication Refill    Caller (if not patient): N/A      Relationship of caller (if not patient): N/A      Best contact number(s): [phone]      Name of medication and dosage if known: \" Losartan\" 50 mg      Is patient out of this medication (yes/no):   No, has 2 pills left      Pharmacy name: [facility] listed in chart? (yes/no): Yes  Pharmacy phone number: [phone]      Details to clarify the request: N/A      Benjamin Ruiz

## 2021-02-11 ENCOUNTER — VIRTUAL VISIT (OUTPATIENT)
Dept: FAMILY MEDICINE CLINIC | Age: 71
End: 2021-02-11
Payer: MEDICARE

## 2021-02-11 DIAGNOSIS — R05.9 COUGH: Primary | ICD-10-CM

## 2021-02-11 PROCEDURE — 99442 PR PHYS/QHP TELEPHONE EVALUATION 11-20 MIN: CPT | Performed by: FAMILY MEDICINE

## 2021-02-11 RX ORDER — BISMUTH SUBSALICYLATE 262 MG
1 TABLET,CHEWABLE ORAL DAILY
COMMUNITY

## 2021-02-11 RX ORDER — AZITHROMYCIN 250 MG/1
TABLET, FILM COATED ORAL
Qty: 6 TAB | Refills: 0 | Status: SHIPPED | OUTPATIENT
Start: 2021-02-11 | End: 2021-02-16

## 2021-02-11 NOTE — PROGRESS NOTES
Robert Zee is a 65 y.o. female, evaluated via audio-only technology on 2/11/2021 for Cough (Patient reports on-going x2 wks. Patient reports treatment with otc helping some. Patient reports causing her blood pressure to become elevated. ) and Fatigue  . C/o cough and fatigue x 2 weeks  Using ricola w echinacea  She denies fever  Assessment & Plan:   Diagnoses and all orders for this visit:    1. Cough  -     azithromycin (ZITHROMAX) 250 mg tablet; Take 2 tablets today, then take 1 tablet daily        Go get covid test right away  12  Subjective:       Prior to Admission medications    Medication Sig Start Date End Date Taking? Authorizing Provider   ascorbic acid (JATINDER-C PO) Take  by mouth. Yes Provider, Historical   multivitamin (ONE A DAY) tablet Take 1 Tab by mouth daily. Yes Provider, Historical   azithromycin (ZITHROMAX) 250 mg tablet Take 2 tablets today, then take 1 tablet daily 2/11/21 2/16/21 Yes Griselda Hutchison MD   losartan-hydroCHLOROthiazide [facility]) 50-12.5 mg per tablet take 1 tablet by mouth once daily 1/17/21  Yes Griselda Hutchison MD   magnesium 250 mg tab Take 250 mg by mouth. Yes Provider, Historical   alendronate (FOSAMAX) 35 mg tablet Take 1 Tab by mouth every seven (7) days. 6/15/20   Griselda Hutchison MD     There are no active problems to display for this patient. Current Outpatient Medications   Medication Sig Dispense Refill    ascorbic acid (JATINDER-C PO) Take  by mouth.  multivitamin (ONE A DAY) tablet Take 1 Tab by mouth daily.  azithromycin (ZITHROMAX) 250 mg tablet Take 2 tablets today, then take 1 tablet daily 6 Tab 0    losartan-hydroCHLOROthiazide (HYZAAR) 50-12.5 mg per tablet take 1 tablet by mouth once daily 90 Tab 1    magnesium 250 mg tab Take 250 mg by mouth.  alendronate (FOSAMAX) 35 mg tablet Take 1 Tab by mouth every seven (7) days.  9 Tab 3       ROS    Patient-Reported Vitals 2/11/2021   Patient-Reported Weight 158lb        Robert Zee, who was evaluated through a patient-initiated, synchronous (real-time) audio only encounter, and/or her healthcare decision maker, is aware that it is a billable service, with coverage as determined by her insurance carrier. She provided verbal consent to proceed: Yes. She has not had a related appointment within my department in the past 7 days or scheduled within the next 24 hours.       Total Time: minutes: 11-20 minutes    Tommy Campos MD

## 2021-02-11 NOTE — PROGRESS NOTES
1. Have you been to the ER, urgent care clinic since your last visit? Hospitalized since your last visit? No    2. Have you seen or consulted any other health care providers outside of the [address] [place] [address] since your last visit? Include any pap smears or colon screening. No     Patient states yesterday bp 138/85. Chief Complaint   Patient presents with    Cough     Patient reports on-going x2 wks. Patient reports treatment with otc helping some. Patient reports causing her blood pressure to become elevated.  Fatigue     Patient-Reported Vitals 2/11/2021   Patient-Reported Weight 158lb      3 most recent PHQ Screens 2/11/2021   Little interest or pleasure in doing things Not at all   Feeling down, depressed, irritable, or hopeless Not at all   Total Score PHQ 2 0     Health Maintenance Due   Topic Date Due    Hepatitis C Screening  1950    COVID-19 Vaccine (1 of 2) 02/26/1964    DTaP/Tdap/Td series (1 - Tdap) 02/26/1969    Shingrix Vaccine Age 50> (1 of 2) 02/26/1998    Breast Cancer Screen Mammogram  02/26/1998    GLAUCOMA SCREENING Q2Y  02/26/2013    Pneumococcal 65+ years (1 of 1 - PPSV23) 02/26/2013    Flu Vaccine (1) 09/01/2020     Pharmacy verified.    [address]

## 2021-02-22 ENCOUNTER — TELEPHONE (OUTPATIENT)
Dept: FAMILY MEDICINE CLINIC | Age: 71
End: 2021-02-22

## 2021-02-22 NOTE — TELEPHONE ENCOUNTER
Please advise     Thank You       ----- Message from Nathalie Juan sent at 2/22/2021  9:27 AM EST -----  Regarding: Covid Test Results  Contact: [phone]  General Message/Vendor Calls    Caller's first and last name: NA      Reason for call:  Patient instructed to contact primary care physician for results from recent covid testing. Callback required yes/no and why: Yes, discussion of test results for covid. Best contact number(s):[phone]      Details to clarify the request: Patient advised tested for Covid at [facility] about 11 days ago. Patient instructed to reach out to primary care office for results as [facility] advising results e-mailed to physician. Please follow up with patient regarding results and/or any instructions at this time. Reference id # [id number].       Nathalie Juan

## 2021-03-01 NOTE — TELEPHONE ENCOUNTER
I have been unable to reach this patient due to Voicemail full and busy signal  Both numbers in record called. 1st attempt.

## 2021-03-15 ENCOUNTER — PATIENT OUTREACH (OUTPATIENT)
Dept: HEALTH INFORMATION MANAGEMENT | Facility: OTHER | Age: 71
End: 2021-03-15

## 2021-03-15 NOTE — PROGRESS NOTES
Called patient to schedule for AWV.    Outcome:   Spoke to patient. Declined scheduling an appointment at this time as she is currently in [place] staying with family [facility]. She will call back to schedule when needed.     Attempt # 1  -aep

## 2021-04-15 PROBLEM — M41.9 SCOLIOSIS OF THORACIC SPINE: Status: ACTIVE | Noted: 2021-04-15

## 2021-04-30 LAB — SARS-COV-2, NAA: NOT DETECTED

## 2021-08-10 ENCOUNTER — OFFICE VISIT (OUTPATIENT)
Dept: CARDIOLOGY | Facility: CLINIC | Age: 71
End: 2021-08-10
Payer: MEDICARE

## 2021-08-10 VITALS
HEIGHT: 65 IN | DIASTOLIC BLOOD PRESSURE: 70 MMHG | BODY MASS INDEX: 38.82 KG/M2 | SYSTOLIC BLOOD PRESSURE: 126 MMHG | WEIGHT: 233 LBS | OXYGEN SATURATION: 96 % | HEART RATE: 100 BPM

## 2021-08-10 DIAGNOSIS — I10 HYPERTENSION, ESSENTIAL: ICD-10-CM

## 2021-08-10 DIAGNOSIS — I70.213 ATHEROSCLEROSIS OF NATIVE ARTERY OF BOTH LOWER EXTREMITIES WITH INTERMITTENT CLAUDICATION (HCC): ICD-10-CM

## 2021-08-10 DIAGNOSIS — E78.2 MIXED HYPERLIPIDEMIA: ICD-10-CM

## 2021-08-10 DIAGNOSIS — Z98.890 H/O CAROTID ENDARTERECTOMY: ICD-10-CM

## 2021-08-10 DIAGNOSIS — E66.9 CLASS 2 OBESITY: ICD-10-CM

## 2021-08-10 DIAGNOSIS — E78.1 HYPERTRIGLYCERIDEMIA: ICD-10-CM

## 2021-08-10 DIAGNOSIS — I65.23 BILATERAL CAROTID ARTERY STENOSIS: ICD-10-CM

## 2021-08-10 DIAGNOSIS — I35.1 MILD AI (AORTIC INSUFFICIENCY): ICD-10-CM

## 2021-08-10 PROCEDURE — 99214 OFFICE O/P EST MOD 30 MIN: CPT | Performed by: INTERNAL MEDICINE

## 2021-08-10 RX ORDER — RIZATRIPTAN BENZOATE 10 MG/1
10 TABLET ORAL
COMMUNITY
End: 2021-08-27

## 2021-08-10 RX ORDER — GLIMEPIRIDE 2 MG/1
TABLET ORAL
COMMUNITY
Start: 2021-05-21 | End: 2021-08-27

## 2021-08-10 RX ORDER — LOVASTATIN 20 MG/1
20 TABLET ORAL DAILY
COMMUNITY
End: 2021-08-10

## 2021-08-10 ASSESSMENT — ENCOUNTER SYMPTOMS
DIZZINESS: 0
CHILLS: 0
FOCAL WEAKNESS: 0
PALPITATIONS: 0
NAUSEA: 0
CLAUDICATION: 0
PND: 0
COUGH: 0
BLURRED VISION: 0
FEVER: 0
SORE THROAT: 0
WEAKNESS: 0
ABDOMINAL PAIN: 0
BRUISES/BLEEDS EASILY: 0
FALLS: 0
SHORTNESS OF BREATH: 0

## 2021-08-10 ASSESSMENT — FIBROSIS 4 INDEX: FIB4 SCORE: 1.21

## 2021-08-10 NOTE — PATIENT INSTRUCTIONS
Work on at least 1.5 - 5 hours a week of moderate exercise (typical brisk walking or similar activity)    If you have had a heart attack, stent, bypass or reduced heart strength (EF <35%): cardiac rehab may reduce your risk of dying by 13-24% and need to go to the hospital by 30% within the first year (1)    Please look into the following diets and incorporate them into your diet    LOW SALT DIET   KEEP YOUR SODIUM EQUAL TO CALORIES AND NO MORE THAN DOUBLE THE CALORIES FOR A LOW SALT DIET    Cardiosmart.org - great resource for American College of Cardiology on heart disease prevention and treatment    FOR TREATMENT OR PREVENTION OF CORONARY ARTERY DISEASE  These three programs are approved by Medicare/Insurers for those with heart disease  Di - [facility]  Dr. Nichols's Program for Reversing Heart Disease - Matty Mayer's Cardiologist vegetarian-based  [facility] - [place]-based mind-body Program    This is a commonly referenced Program  Dr Salmon - Mandeep over [place] (book and documentary) - vegetarian-based    FOR TREATMENT OF BLOOD PRESSURE  DASH DIET - American Heart Association for treatment of HYPERTENSION    FOR TREATMENT OF BAD CHOLESTEROL/FATS  REDUCE PROCESSED SUGAR AS MUCH AS POSSIBLE  INCREASE WHOLE GRAINS/VEGETABLES  INCREASE FIBER    Lowering total cholesterol and LDL (bad) cholesterol:  - Eat leaner cuts of meat, or eliminate altogether if possible red meat, and frequently substitute fish or chicken.  - Limit saturated fat to no more than 7-10% of total calories no more than 10 g per day is recommended. Some sources of saturated fat include butter, animal fats, hydrogenated vegetable fats and oils, many desserts, whole milk dairy products.  - Replaced saturated fats with polyunsaturated fats and monounsaturated fats. Foods high in monounsaturated fat include nuts, canola oil, avocados, and olives.  - Limit trans fat (processed foods)  and replaced with fresh fruits and vegetables  - Recommend nonfat dairy products  - Increase substantially the amount of soluble fiber intake (legumes such as beans, fruit, whole grains).  - Consider nutritional supplements: plant sterile spreads such as Benecol, fish oil,  flaxseed oil, omega-3 acids capsules 1000 mg twice a day, or viscous fiber such as Metamucil  - Attain ideal weight and regular exercise (at least 30 minutes per day of moderate exercise)  ASK ABOUT STATIN OR NON STATIN MEDICATION TO REDUCE YOUR LDL AND HEART RISK    Lowering triglycerides:  - Reduce intake of simple sugar: Desserts, candy, pastries, honey, sodas, sugared cereals, yogurt, Gatorade, sports bars, canned fruit, smoothies, fruit juice, coffee drinks  - Reduced intake of refined starches: Refined Pasta, most bread  - Reduce or abstain from alcohol  - Increase omega-3 fatty acids: [place], Trout, Mackerel, Herring, Albacore tuna and supplements  - Attain ideal weight and regular exercise (at least 30 minutes per day of moderate exercise)  ASK ABOUT PURIFIED OMEGA 3 EPA or FISH OIL TO REDUCE YOUR TG AND HEART RISK    Elevating HDL (good) cholesterol:  - Increase physical activity  - Increase omega-3 fatty acids and supplements as listed above  - Incorporating appropriate amounts of monounsaturated fats such as nuts, olive oil, canola oil, avocados, olives  - Stop smoking  - Attain ideal weight and regular exercise (at least 30 minutes per day of moderate exercise)

## 2021-08-10 NOTE — PROGRESS NOTES
"Chief Complaint   Patient presents with   • Hyperlipidemia     F/V DX:Mixed hyperlipidemia       Subjective:   Sylvia Diallo is a 71 y.o. female who presents today for follow-up of her history of sleep apnea obesity and mild elevated pulmonary pressures with history of vascular disease establishing with me after following long-term with our clinic    She is done well over the last year    Had CP and tinglingling in left arm    Possible COVID fog since       Past Medical History:   Diagnosis Date   • Anxiety    • Anxiety    • Arthritis    • Back pain    • Blind right eye    • Bowel habit changes     IBS   • Carotid disease, bilateral (HCC) January 2017    Carotid ultrasound with mild stenosis bilaterally.   • Chicken pox     history of   • Depression    • EBV exposure    • Gallbladder disease    • Headache(784.0)    • Heart burn     medicated, \"not so bad anymore\"   • Heart disease    • Hemorrhoids    • Herpes     history of   • High cholesterol    • Hypertension    • Measles     history of   • Migraine    • Mumps     history of   • Peptic ulcer    • Pleurisy    • Rheumatic fever     history of   • [place] fever     unspecified, history of   • Sleep apnea with use of continuous positive airway pressure (CPAP)    • Snoring     sleep study done   • Stomach ulcer    • Transfusion history    • Tuberculosis     never had tb, was exposed as a child, she reports that she has never been infected with TB   • Urinary incontinence    • Venereal disease     test +, history of     Past Surgical History:   Procedure Laterality Date   • PENETRATING KERATOPLASTY Right 4/24/2018    Procedure: PENETRATING KERATOPLASTY;  Surgeon: Moises Lennon M.D.;  Location: SURGERY SAME DAY [facility];  Service: Ophthalmology   • EXTENSOR TENDON REPAIR Right 11/6/2017    Procedure: RT EXTENSOR TENDON REPAIR INDICIS PROPRIUS TO EXTENSOR POLLICIS LONGUS TRANSFER, POSSIBLE PALMARIS LONGUS HARVEST AND TRANSFER;  Surgeon: Tahir CARRINGTON" JHON Garnett;  Location: SURGERY [facility];  Service: Orthopedics   • COLONOSCOPY  2016   • KNEE REPLACEMENT, TOTAL Bilateral    • PELVIC RECONSTRUCTION LAPAROSCOPIC N/A    • OTHER      R eye lense   • CHOLECYSTECTOMY     • ABDOMINAL HYSTERECTOMY TOTAL     • CERVICAL FUSION POSTERIOR N/A    • ABDOMINAL EXPLORATION      Gall bladder removal   • CAROTID ENDARTERECTOMY Left    • OTHER ORTHOPEDIC SURGERY Right     shoulder rotator cuff repair   • OTHER ORTHOPEDIC SURGERY Bilateral     total knee replacement     Family History   Problem Relation Age of Onset   • Cancer Mother    • Arthritis Mother    • Heart Disease Mother    • Lung Disease Father    • Arthritis Father    • Heart Disease Father      Social History     Socioeconomic History   • Marital status:      Spouse name: Not on file   • Number of children: Not on file   • Years of education: Not on file   • Highest education level: Not on file   Occupational History   • Not on file   Tobacco Use   • Smoking status: Former Smoker     Packs/day: 0.50     Years: 1.00     Pack years: 0.50     Types: Cigarettes     Quit date: 1972     Years since quittin.1   • Smokeless tobacco: Never Used   Vaping Use   • Vaping Use: Never used   Substance and Sexual Activity   • Alcohol use: Yes     Comment: occasional, 1 x a year   • Drug use: No   • Sexual activity: Not on file   Other Topics Concern   • Not on file   Social History Narrative   • Not on file     Social Determinants of Health     Financial Resource Strain:    • Difficulty of Paying Living Expenses:    Food Insecurity:    • Worried About Running Out of Food in the Last Year:    • Ran Out of Food in the Last Year:    Transportation Needs:    • Lack of Transportation (Medical):    • Lack of Transportation (Non-Medical):    Physical Activity:    • Days of Exercise per Week:    • Minutes of Exercise per Session:    Stress:    • Feeling of Stress :    Social Connections:    •  Frequency of Communication with Friends and Family:    • Frequency of Social Gatherings with Friends and Family:    • Attends Sikh Services:    • Active Member of Clubs or Organizations:    • Attends Club or Organization Meetings:    • Marital Status:    Intimate Partner Violence:    • Fear of Current or Ex-Partner:    • Emotionally Abused:    • Physically Abused:    • Sexually Abused:      Allergies   Allergen Reactions   • Darvon [Propoxyphene] Shortness of Breath and Swelling     Lip swelling, sob- reaction happens quickly   • Food Shortness of Breath and Swelling     Fish and shellfish- blisters in mouth, throat, throat swelling, sob   • Iodine Anaphylaxis     allergic to shellfish- anaphylaxis   • Penicillins Shortness of Breath, Rash and Swelling     Rash, swelling, sob   • Shellfish Allergy Anaphylaxis     anaphylaxis   • Seasonal      Runny nose   • Codeine Rash and Itching     Rash, itching     Outpatient Encounter Medications as of 8/10/2021   Medication Sig Dispense Refill   • rizatriptan (MAXALT) 10 MG tablet Take 10 mg by mouth one time as needed.     • lovastatin (MEVACOR) 20 MG Tab Take 20 mg by mouth every day.     • timolol (TIMOPTIC) 0.25 % Solution      • rosuvastatin (CRESTOR) 10 MG Tab Take 1 tablet by mouth every evening. 90 tablet 1   • irbesartan (AVAPRO) 150 MG Tab Take 1 tablet by mouth every day. 30 tablet 3   • esomeprazole (NEXIUM) 40 MG delayed-release capsule Take 1 capsule by mouth every morning before breakfast. 30 capsule 3   • cyclobenzaprine (FLEXERIL) 10 mg Tab 1 tab at hs prn myofascial pain 30 tablet 3   • SYMBICORT 160-4.5 MCG/ACT Aerosol INHALE 2 PUFFS TWICE DAILY 3 Each 3   • prednisoLONE acetate (PRED MILD) 0.12 % Suspension Administer 1 Drop into both eyes 4 times a day.     • Erenumab (AIMOVIG) 70 MG/ML Solution Auto-injector Inject 70 mg under the skin.     • fenofibrate (TRIGLIDE) 160 MG tablet Take 1 tablet by mouth once daily 90 tablet 0   • gabapentin  (NEURONTIN) 800 MG tablet TAKE 1 TABLET BY MOUTH THREE TIMES DAILY 270 tablet 0   • nortriptyline (PAMELOR) 50 MG capsule Take 1 capsule by mouth every day. At bedtime 90 capsule 3   • estradiol (ESTRACE) 1 MG Tab TAKE 1 & 1/2 (ONE & ONE-HALF) TABLETS BY MOUTH ONCE DAILY 135 tablet 3   • VENTOLIN  (90 Base) MCG/ACT Aero Soln inhalation aerosol INHALE 2 PUFFS BY MOUTH EVERY 6 HOURS AS NEEDED FOR SHORTNESS OF BREATH 18 g 0   • ezetimibe (ZETIA) 10 MG Tab TAKE 1 TABLET EVERY DAY 90 Tab 3   • Riboflavin 400 MG Cap 1 daily 100 Cap 3   • citalopram (CELEXA) 10 MG tablet TAKE 1 TABLET EVERY DAY 90 Tab 3   • valACYclovir (VALTREX) 500 MG Tab 1 tablet twice a day for 5 days as needed for herpes simplex treatment 40 Tab 3   • SUMAtriptan (IMITREX) 50 MG Tab Take 1 Tab by mouth Once PRN for Migraine for up to 1 dose. 10 Tab 3   • Coenzyme Q10 (CO Q 10 PO) Take  by mouth.     • Spacer/Aero-Holding Chambers (MICROCHAMBER) Misc   0   • dorzolamide-timolol (COSOPT) 22.3-6.8 MG/ML Solution      • Cyanocobalamin (B-12 PO) Take  by mouth.     • aspirin (ASA) 81 MG Chew Tab chewable tablet Chew 81 mg every day.     • vitamin D (CHOLECALCIFEROL) 1000 UNIT Tab Take 1,000 Units by mouth every day.     • Multiple Vitamins-Calcium (ONE-A-DAY WOMENS PO) Take 1 Tab by mouth every day.     • POTASSIUM CHLORIDE PO Take 1 Tab by mouth every day.     • MELATONIN PO Take 1 Tab by mouth every bedtime.     • Cetirizine HCl (ZYRTEC ALLERGY PO) Take 1 Tab by mouth every day.     • NON SPECIFIED SLEEP APNEA MACHINE     • metoprolol SR (TOPROL XL) 100 MG TABLET SR 24 HR Take 1 tablet by mouth every day. (Patient not taking: Reported on 7/28/2021) 30 tablet 3     No facility-administered encounter medications on file as of 8/10/2021.     Review of Systems   Constitutional: Negative for chills and fever.   HENT: Negative for sore throat.    Eyes: Negative for blurred vision.   Respiratory: Negative for cough and shortness of breath.   "  Cardiovascular: Negative for chest pain, palpitations, claudication, leg swelling and PND.   Gastrointestinal: Negative for abdominal pain and nausea.   Musculoskeletal: Negative for falls and joint pain.   Skin: Negative for rash.   Neurological: Negative for dizziness, focal weakness and weakness.   Endo/Heme/Allergies: Does not bruise/bleed easily.        Objective:   /70 (BP Location: Right arm, Patient Position: Sitting, BP Cuff Size: Adult)   Pulse 100   Ht 1.651 m (5' 5\")   Wt 106 kg (233 lb)   LMP 01/01/1976 (Approximate)   SpO2 96%   BMI 38.77 kg/m²     Physical Exam   Constitutional: No distress.   Eyes: No scleral icterus.   Neck: No JVD present.   Cardiovascular: Normal rate and normal heart sounds. Exam reveals no gallop and no friction rub.   No murmur heard.  Pulmonary/Chest: No respiratory distress. She has no wheezes. She has no rales.   Abdominal: Soft. Bowel sounds are normal.   Neurological: She is alert.   Skin: No rash noted. She is not diaphoretic.     We reviewed in person the most recent labs  Recent Results (from the past 5040 hour(s))   Comp Metabolic Panel    Collection Time: 06/28/21 10:02 AM   Result Value Ref Range    Sodium 132 (L) 136 - 145 mmol/L    Potassium 4.2 3.5 - 5.1 mmol/L    Chloride 97 (L) 98 - 107 mmol/L    Co2 27 21 - 32 mmol/L    Anion Gap 12 10 - 18 mmol/L    Glucose 97 74 - 99 mg/dL    Bun 12 7 - 18 mg/dL    Creatinine 0.9 0.6 - 1.0 mg/dL    Calcium 9.1 8.5 - 11.0 mg/dL    AST(SGOT) 23 15 - 37 U/L    ALT(SGPT) 21 12 - 78 U/L    Alkaline Phosphatase 62 46 - 116 U/L    Total Bilirubin 0.4 0.2 - 1.0 mg/dL    Albumin 3.9 3.4 - 5.0 g/dL    Total Protein 7.6 6.4 - 8.2 g/dL    A-G Ratio 1.1    TSH    Collection Time: 06/28/21 10:02 AM   Result Value Ref Range    TSH 3.53 0.36 - 3.74 uIU/mL   CBC WITH DIFFERENTIAL    Collection Time: 06/28/21 10:02 AM   Result Value Ref Range    WBC 7.0 4.8 - 10.8 K/uL    RBC 4.64 4.20 - 5.40 M/uL    Hemoglobin 14.0 13.0 - 17.0 " g/dL    Hematocrit 42.4 39.0 - 50.0 %    MCV 91.4 81.0 - 99.0 fL    MCH 30.2 27.0 - 31.0 pg    MCHC 33.0 33.0 - 37.0 g/dL    RDW 14.0 11.5 - 14.5 %    Platelet Count 303 130 - 400 K/uL    MPV 10.5 (H) 7.4 - 10.4 fL    Neutrophils Automated 65.8 39.0 - 70.0 %    Lymphocytes Automated 19.2 (L) 21.0 - 50.0 %    Monocytes Automated 10.4 (H) 2.0 - 9.0 %    Eosinophils Automated 3.0 0.0 - 5.0 %    Basophils Automated 0.9 0.0 - 3.0 %    Abs Neutrophils Automated 4.6 1.8 - 7.7 K/uL    Abs Lymph Automated 1.4 1.2 - 4.8 K/uL    Eosinophil Count, Blood 0.21 0.00 - 0.50 K/uL   Lipid Profile    Collection Time: 06/28/21 10:02 AM   Result Value Ref Range    Cholesterol,Tot 224 (H) 120 - 200 mg/dL    Triglycerides 188 (H) 0 - 150 mg/dL     (H) <100 mg/dL    HDL 67.0 (H) 40.0 - 60.0 mg/dL    Chol-Hdl Ratio 3.34     Non HDL Cholesterol 157 30 - 160   ESTIMATED GFR    Collection Time: 06/28/21 10:02 AM   Result Value Ref Range    GFR If African American >60 >60 mL/min/1.73 m 2    GFR If Non African American >60 >60 mL/min/1.73 m 2         Cardiac testing reviewed from 2019 normal EF pulmonary pressure 45  Assessment:     1. Mixed hyperlipidemia     2. Hypertension, essential     3. Bilateral carotid artery stenosis     4. H/O carotid endarterectomy     5. Class 2 obesity     6. Hypertriglyceridemia     7. Mild AI (aortic insufficiency)         Medical Decision Making:  Today's Assessment / Status / Plan:     It was my pleasure to meet with Ms. Diallo.    We addressed the management of hypertension at today's visit. Blood pressure is well controlled.  We specifically assessed the labs on hypertension treatment    We addressed the management of dyslipidemia at today's visit. She is on appropriate statin.    We addressed the management of hypertriglyceridemia at today's visit. Vascepa or Fish oil (if Vascepa too expensive) for elevated triglycerides (REDUCE IT TRIAL SHOWED reduction from 22% 5 year MACE to 17%).    We discussed  the importance of meaningful weight loss.    I will see Ms. Diallo back in 1 year time and encouraged her to follow up with us over the phone or electronically using my MyChart as issues arise.    It is my pleasure to participate in the care of Ms. Diallo.  Please do not hesitate to contact me with questions or concerns.    Les Dias MD PhD [facility]  Cardiologist [facility]    Please note that this dictation was created using voice recognition software. There may be errors I did not discover before finalizing the note.

## 2021-08-25 ENCOUNTER — HOSPITAL ENCOUNTER (OUTPATIENT)
Dept: RADIOLOGY | Facility: MEDICAL CENTER | Age: 71
End: 2021-08-25
Attending: INTERNAL MEDICINE
Payer: MEDICARE

## 2021-08-25 DIAGNOSIS — Z98.890 H/O CAROTID ENDARTERECTOMY: ICD-10-CM

## 2021-08-25 DIAGNOSIS — I70.213 ATHEROSCLEROSIS OF NATIVE ARTERY OF BOTH LOWER EXTREMITIES WITH INTERMITTENT CLAUDICATION (HCC): ICD-10-CM

## 2021-08-25 DIAGNOSIS — I65.23 BILATERAL CAROTID ARTERY STENOSIS: ICD-10-CM

## 2021-08-25 PROCEDURE — 93880 EXTRACRANIAL BILAT STUDY: CPT | Mod: 26 | Performed by: INTERNAL MEDICINE

## 2021-08-25 PROCEDURE — 93880 EXTRACRANIAL BILAT STUDY: CPT

## 2021-08-25 PROCEDURE — 93922 UPR/L XTREMITY ART 2 LEVELS: CPT

## 2021-08-25 PROCEDURE — 93922 UPR/L XTREMITY ART 2 LEVELS: CPT | Mod: 26 | Performed by: INTERNAL MEDICINE

## 2021-08-26 ENCOUNTER — PATIENT MESSAGE (OUTPATIENT)
Dept: CARDIOLOGY | Facility: MEDICAL CENTER | Age: 71
End: 2021-08-26

## 2021-08-26 NOTE — TELEPHONE ENCOUNTER
----- Message from Les Dias M.D. sent at 8/25/2021  3:09 PM PDT -----  The ultrasound duplex looks good, please let her know     Thank you

## 2021-10-04 ENCOUNTER — TELEPHONE (OUTPATIENT)
Dept: CARDIOLOGY | Facility: MEDICAL CENTER | Age: 71
End: 2021-10-04

## 2021-10-04 NOTE — LETTER
October 4, 2021        Sylvia Diallo  [address] [place] [address]          Dear Sylvia,    We have received the results of your recent: Carotid and Lower Extremity Ultrasound    We received your results in August and sent a [employer] message regarding Dr. Dias's recommendations.  As of today, the messages have not been read by you.  Dr. Dias states both your Carotid and Lower Extremity Ultrasounds look good.  Please follow up as previously discussed with your physician.      Feel free to call us with any questions.        Sincerely,          Lucy Dias  Electronically Signed

## 2021-11-29 ENCOUNTER — TELEPHONE (OUTPATIENT)
Dept: FAMILY MEDICINE CLINIC | Age: 71
End: 2021-11-29

## 2021-11-29 NOTE — TELEPHONE ENCOUNTER
Pt daughter call stated that she has sinus infection and BP is high.  Need [facility] nurse to call for advise @ [phone]

## 2022-05-12 PROBLEM — J30.1 ALLERGIC RHINITIS DUE TO POLLEN: Status: ACTIVE | Noted: 2022-05-12

## 2022-08-15 NOTE — MR AVS SNAPSHOT
"        Sylvia Yoel   2017 9:20 AM   Office Visit   MRN: [id number]    [facility]  [facility]   Dept Phone:  [phone]    Description:  Female : 1950   Provider:  Maura Gates M.D.           Reason for Visit     Follow-Up           Allergies as of 2017     Allergen Noted Reactions    Darvon [Propoxyphene] 2015       Food 2015   Unspecified    fish    Iodine 2017   Anaphylaxis    allergeic to shellfish    Penicillins 2015       Seasonal 2015       Shellfish Allergy 2017   Anaphylaxis    Codeine 2016   Itching      You were diagnosed with     Dyslipidemia   [790761]       H/O carotid endarterectomy   [691449]       Hypertension, essential   [183919]       Sleep apnea, unspecified type   [7757396]         Vital Signs     Blood Pressure Pulse Height Weight Body Mass Index Oxygen Saturation    120/70 mmHg 71 1.664 m (5' 5.51\") 101.152 kg (223 lb) 36.53 kg/m2 91%    Last Menstrual Period Smoking Status                1976 Former Smoker          Basic Information     Date Of Birth Sex Race Ethnicity Preferred Language    1950 Female White Non- English      Your appointments     2017  9:00 AM   30 Minute with Arun Cheema M.D.   [facility] (--)    [facility]  [facility] [id number] [phone]              Problem List              ICD-10-CM Priority Class Noted - Resolved    Migraine without aura and without status migrainosus, not intractable G43.009   2015 - Present    Bilateral carotid artery disease (CMS-HCC) I77.9   2015 - Present    Hyperlipidemia E78.5   2015 - Present    Hypertension, essential I10   2015 - Present    Osteoarthritis cervical spine M47.812   2015 - Present    Chronic neck pain M54.2, G89.29   2015 - Present    Family hx of colon cancer Z80.0   2015 - Present    History of peptic ulcer disease Z87.11   2015 - " Present    History of colon polyps Z86.010   11/4/2015 - Present    Diverticulosis of large intestine without hemorrhage K57.30   11/13/2015 - Present    Internal hemorrhoids K64.8   11/13/2015 - Present    H/O carotid endarterectomy Z98.890   1/3/2017 - Present    Dyslipidemia E78.5   1/3/2017 - Present      Health Maintenance        Date Due Completion Dates    IMM DTaP/Tdap/Td Vaccine (1 - Tdap) 2/26/1967 ---    PAP SMEAR 2/26/1969 ---    MAMMOGRAM 2/26/1988 ---    COLONOSCOPY 2/26/1998 ---    IMM ZOSTER VACCINE 2/26/2008 ---    BONE DENSITY 2/26/2013 ---    IMM INFLUENZA (1) 9/1/2016 10/29/2015    IMM PNEUMOCOCCAL 65+ (ADULT) LOW/MEDIUM RISK SERIES (2 of 2 - PPSV23) 3/1/2017 3/1/2016            Current Immunizations     13-VALENT PCV PREVNAR 3/1/2016  9:46 AM    Influenza Vaccine Quad Inj (Preserved) 10/29/2015      Below and/or attached are the medications your provider expects you to take. Review all of your home medications and newly ordered medications with your provider and/or pharmacist. Follow medication instructions as directed by your provider and/or pharmacist. Please keep your medication list with you and share with your provider. Update the information when medications are discontinued, doses are changed, or new medications (including over-the-counter products) are added; and carry medication information at all times in the event of emergency situations     Allergies:  DARVON - (reactions not documented)     FOOD - Unspecified     IODINE - Anaphylaxis     PENICILLINS - (reactions not documented)     SEASONAL - (reactions not documented)     SHELLFISH ALLERGY - Anaphylaxis     CODEINE - Itching               Medications  Valid as of: February 14, 2017 -  9:46 AM    Generic Name Brand Name Tablet Size Instructions for use    Atorvastatin Calcium (Tab) LIPITOR 20 MG TAKE 1 TABLET EVERY DAY IN THE EVENING        Citalopram Hydrobromide (Tab) CELEXA 10 MG TAKE 1 TABLET EVERY DAY        Clindamycin HCl  (Cap) CLEOCIN 150 MG 2  Caps 1 hour prior to dental work        Estradiol (Tab) ESTRACE 1 MG TAKE 1 TABLET EVERY DAY        Gabapentin (Tab) NEURONTIN 800 MG TAKE 1 TABLET THREE TIMES DAILY        Lidocaine (Patch) LIDODERM 5 % Apply 1 Patch to skin as directed every 24 hours.        Lovastatin (Tab) MEVACOR 20 MG 1 at hs        Metoprolol Tartrate (Tab) LOPRESSOR 100 MG TAKE 1 TABLET AT BEDTIME        Metoprolol Tartrate (Tab) LOPRESSOR 100 MG TAKE 1 TABLET AT BEDTIME        Metoprolol Tartrate (Tab) LOPRESSOR 100 MG TAKE ONE TABLET BY MOUTH AT BEDTIME        Metoprolol Tartrate (Tab) LOPRESSOR 100 MG Take 100 mg by mouth every bedtime.        Ofloxacin (Solution) OCUFLOX 0.3 % Place 1 Drop in right eye 4 times a day.        Pantoprazole Sodium (Tablet Delayed Response) PROTONIX 40 MG TAKE 1 TABLET EVERY DAY        Ramipril (Cap) ALTACE 10 MG Take 1 cap daily in the morning        Rizatriptan Benzoate (Tab) MAXALT 10 MG Take 1 Tab by mouth Once PRN for Migraine for up to 1 dose.        TraMADol HCl (Tab) ULTRAM 50 MG Take 1 Tab by mouth every 6 hours as needed.        Triamcinolone Acetonide (Cream) KENALOG 0.1 % Apply bid prn to affected area        .                 Medicines prescribed today were sent to:     [facility] [address] [place], [place] - [id number] [facility] [id number]    Phone: [phone] Fax: [phone]    Open 24 Hours?: No    [facility] - [facility] [address]    [address] [facility] [address]    Phone: [phone] Fax: [phone]    Open 24 Hours?: No      Medication refill instructions:       If your prescription bottle indicates you have medication refills left, it is not necessary to call your provider’s office. Please contact your pharmacy and they will refill your medication.    If your prescription bottle indicates you do not have any refills left, you may request refills at any time through one of the following  ways: The online Mobile Captain system (except Urgent Care), by calling your provider’s office, or by asking your pharmacy to contact your provider’s office with a refill request. Medication refills are processed only during regular business hours and may not be available until the next business day. Your provider may request additional information or to have a follow-up visit with you prior to refilling your medication.   *Please Note: Medication refills are assigned a new Rx number when refilled electronically. Your pharmacy may indicate that no refills were authorized even though a new prescription for the same medication is available at the pharmacy. Please request the medicine by name with the pharmacy before contacting your provider for a refill.        Your To Do List     Future Labs/Procedures Complete By Expires    COMP METABOLIC PANEL  As directed 2/14/2018    LIPID PROFILE  As directed 2/14/2018      Referral     A referral request has been sent to our patient care coordination department. Please allow 3-5 business days for us to process this request and contact you either by phone or mail. If you do not hear from us by the 5th business day, please call us at [phone].           Mobile Captain Access Code: Activation code not generated  Current Mobile Captain Status: Active           15-Aug-2022 09:57

## 2023-01-25 ENCOUNTER — PROCEDURE (OUTPATIENT)
Dept: URBAN - METROPOLITAN AREA CLINIC 10 | Facility: CLINIC | Age: 73
End: 2023-01-25
Payer: MEDICARE

## 2023-01-25 ENCOUNTER — OFFICE VISIT (OUTPATIENT)
Dept: URBAN - METROPOLITAN AREA CLINIC 44 | Facility: CLINIC | Age: 73
End: 2023-01-25
Payer: MEDICARE

## 2023-01-25 DIAGNOSIS — H16.041 MARGINAL CORNEAL ULCER OF RIGHT EYE: ICD-10-CM

## 2023-01-25 DIAGNOSIS — T81.31XA DEHISCENCE OF OPERATION WOUND, INITIAL ENCOUNTER: Primary | ICD-10-CM

## 2023-01-25 DIAGNOSIS — S05.71XA: Primary | ICD-10-CM

## 2023-01-25 DIAGNOSIS — H27.01: ICD-10-CM

## 2023-01-25 PROCEDURE — 99205 OFFICE O/P NEW HI 60 MIN: CPT | Performed by: OPTOMETRIST

## 2023-01-25 PROCEDURE — 99205 OFFICE O/P NEW HI 60 MIN: CPT | Performed by: OPHTHALMOLOGY

## 2023-01-25 PROCEDURE — 65430 CORNEAL SMEAR: CPT | Performed by: OPHTHALMOLOGY

## 2023-01-25 RX ORDER — PREDNISOLONE ACETATE 10 MG/ML
1 % SUSPENSION/ DROPS OPHTHALMIC
Qty: 5 | Refills: 3 | Status: ACTIVE
Start: 2023-01-25

## 2023-01-25 RX ORDER — OFLOXACIN 3 MG/ML
0.3 % SOLUTION/ DROPS OPHTHALMIC
Qty: 5 | Refills: 1 | Status: INACTIVE
Start: 2023-01-25 | End: 2023-01-27

## 2023-01-25 ASSESSMENT — INTRAOCULAR PRESSURE
OS: 12
OD: 14
OS: 12
OD: 14

## 2023-01-25 NOTE — IMPRESSION/PLAN
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 Plan: Cultures taken today. Start oflox q1hr w/a tomorrow. Start pred BID OD given PKP.

## 2023-01-25 NOTE — IMPRESSION/PLAN
Impression: Avulsion of right eye, initial encounter: S05.71xA. Hx of fall and blow to head 8 days ago. Hx of previous injury to OD resulting in aphakia. In 1997 AC/IOL placed. 4.5 yrs ago PKP. Plan: PLAN: Discussed findings. Rec urgent consult with Dr Randolph Rodriguez. Patient is visiting and will be in town until Feb 15th.

## 2023-01-25 NOTE — IMPRESSION/PLAN
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 Hx of AC/IOL OD. Unable to view lens and suspect expunged secondary to recent trauma. Plan: PLAN: Discussed findings. Will like need further surgery to replace IOL. Patient lives in [place] and will need to follow up there.

## 2023-01-26 ENCOUNTER — POST-OPERATIVE VISIT (OUTPATIENT)
Dept: URBAN - METROPOLITAN AREA CLINIC 44 | Facility: CLINIC | Age: 73
End: 2023-01-26
Payer: MEDICARE

## 2023-01-26 DIAGNOSIS — Z48.810 ENCOUNTER FOR SURGICAL AFTERCARE FOLLOWING SURGERY ON A SENSE ORGAN: Primary | ICD-10-CM

## 2023-01-26 PROCEDURE — 99024 POSTOP FOLLOW-UP VISIT: CPT | Performed by: OPTOMETRIST

## 2023-01-26 ASSESSMENT — INTRAOCULAR PRESSURE
OD: 21
OS: 17

## 2023-01-26 NOTE — IMPRESSION/PLAN
Impression: S/P Corneal Transplant(penetrating Keratoplasty)PKP; AC Washout W/ Wound Repair; Anterior Vitrectomy OD - 1 Day. Encounter for surgical aftercare following surgery on a sense organ  Z48.810. Plan: Discussed findings. Continue to use post-operative drops as directed. RTC as scheduled with tony Lopes.

## 2023-05-25 RX ORDER — ALENDRONATE SODIUM 35 MG/1
35 TABLET ORAL
COMMUNITY
Start: 2020-06-15

## 2023-05-25 RX ORDER — LOSARTAN POTASSIUM AND HYDROCHLOROTHIAZIDE 12.5; 5 MG/1; MG/1
1 TABLET ORAL DAILY
COMMUNITY
Start: 2021-01-17

## 2023-10-18 NOTE — IMPRESSION/PLAN
Impression: Dehiscence of operation wound, initial encounter: T81.31xA. Right. Plan: Dehisence of PKP wound noted today. ACIOL not visualized. s/p fall with head trauma 1 week ago. I recommend emergent surgery today to close globe; R/B/A/Cs discussed, prognosis guarded. Will request b-scan by retina to r/o R/D, choroidals Schedule wound revision, repair of corneal laceration/wound, AC washout, possible anterior vitrectomy, subconjunctival antibiotic injections OD today Administer vancomycin IV 500mg over 30-45 mins. Detail Level: Detailed Biopsy Photograph Reviewed: Yes Number Of Curettages: 3 Size Of Lesion In Cm: 0.7 Size Of Lesion After Curettage: 1.3 Add Intralesional Injection: No Total Volume (Ccs): 1 Anesthesia Type: 1% lidocaine with epinephrine and a 1:10 solution of 8.4% sodium bicarbonate Anesthesia Volume In Cc: 2.1 Cautery Type: electrodesiccation What Was Performed First?: Curettage Final Size Statement: The size of the lesion after curettage was Additional Information: (Optional): The wound was cleaned, and a pressure dressing was applied.  The patient received detailed post-op instructions. Consent was obtained from the patient. The risks, benefits and alternatives to therapy were discussed in detail. Specifically, the risks of infection, scarring, bleeding, prolonged wound healing, nerve injury, incomplete removal, allergy to anesthesia and recurrence were addressed. Alternatives to ED&C, such as: surgical removal and XRT were also discussed.  Prior to the procedure, the treatment site was clearly identified and confirmed by the patient. All components of Universal Protocol/PAUSE Rule completed. Post-Care Instructions: I reviewed with the patient in detail post-care instructions. Patient is to keep the area dry for 48 hours, and not to engage in any swimming until the area is healed. Should the patient develop any fevers, chills, bleeding, severe pain patient will contact the office immediately. Bill As A Line Item Or As Units: Line Item

## 2023-12-11 PROBLEM — Z87.820 HISTORY OF TRAUMATIC BRAIN INJURY: Status: ACTIVE | Noted: 2023-12-11

## 2023-12-11 PROBLEM — G60.9 IDIOPATHIC PERIPHERAL NEUROPATHY: Status: ACTIVE | Noted: 2023-12-11

## (undated) DEVICE — PUNCH CORNEAL VACUUM 8.00

## (undated) DEVICE — MEDICINE CUP STERILE 2 OZ - (100/CA)

## (undated) DEVICE — Device

## (undated) DEVICE — SUCTION INSTRUMENT YANKAUER BULBOUS TIP W/O VENT (50EA/CA)

## (undated) DEVICE — WATER IRRIGATION STERILE 1000ML (12EA/CA)

## (undated) DEVICE — MASK, LARYNGEAL AIRWAY #4

## (undated) DEVICE — CORDS BIPOLAR COAGULATION - 12FT STERILE DISP. (10EA/BX)

## (undated) DEVICE — ELECTRODE 850 FOAM ADHESIVE - HYDROGEL RADIOTRNSPRNT (50/PK)

## (undated) DEVICE — SPEAR EYE SPNG 3ANG MLBL HNDL - (10/ST18ST/PK 180/PK 1PK/SP)

## (undated) DEVICE — TUBE CONNECTING SUCTION - CLEAR PLASTIC STERILE 72 IN (50EA/CA)

## (undated) DEVICE — SODIUM CHL IRRIGATION 0.9% 1000ML (12EA/CA)

## (undated) DEVICE — CANISTER SUCTION RIGID RED 1500CC (40EA/CA)

## (undated) DEVICE — SET EXTENSION WITH 2 PORTS (48EA/CA) ***PART #2C8610 IS A SUBSTITUTE*****

## (undated) DEVICE — CONTAINER SPECIMEN BAG OR - STERILE 4 OZ W/LID (100EA/CA)

## (undated) DEVICE — LACTATED RINGERS INJ 1000 ML - (14EA/CA 60CA/PF)

## (undated) DEVICE — SUTURE EYE

## (undated) DEVICE — KNIFE MICROSURGICAL 15 DEGREE GREEN

## (undated) DEVICE — SENSOR SPO2 NEO LNCS ADHESIVE (20/BX) SEE USER NOTES

## (undated) DEVICE — CANISTER SUCTION 3000ML MECHANICAL FILTER AUTO SHUTOFF MEDI-VAC NONSTERILE LF DISP  (40EA/CA)

## (undated) DEVICE — SET LEADWIRE 5 LEAD BEDSIDE DISPOSABLE ECG (1SET OF 5/EA)

## (undated) DEVICE — GLOVE BIOGEL SZ 7.5 SURGICAL PF LTX - (50PR/BX 4BX/CA)

## (undated) DEVICE — KIT ANESTHESIA W/CIRCUIT & 3/LT BAG W/FILTER (20EA/CA)

## (undated) DEVICE — CATHETER IV 20 GA X 1-1/4 ---SURG.& SDS ONLY--- (50EA/BX)

## (undated) DEVICE — TUBING CLEARLINK DUO-VENT - C-FLO (48EA/CA)

## (undated) DEVICE — MASK ANESTHESIA ADULT  - (100/CA)

## (undated) DEVICE — KIT  I.V. START (100EA/CA)

## (undated) DEVICE — PEN SKIN MARKER W/RULER - (50EA/BX)

## (undated) DEVICE — PAD EYE GAUZE COVERED OVAL 1 5/8 X 2 5/8" STERILE"

## (undated) DEVICE — SHIELD OPTH AL GRTR CVR FOX (50EA/BX)

## (undated) DEVICE — SUTURE 10-0 ETHILON TG160-4-3M (12PK/BX)